# Patient Record
Sex: FEMALE | Race: WHITE | NOT HISPANIC OR LATINO | Employment: UNEMPLOYED | URBAN - METROPOLITAN AREA
[De-identification: names, ages, dates, MRNs, and addresses within clinical notes are randomized per-mention and may not be internally consistent; named-entity substitution may affect disease eponyms.]

---

## 2020-11-19 ENCOUNTER — OFFICE VISIT (OUTPATIENT)
Dept: FAMILY MEDICINE CLINIC | Facility: CLINIC | Age: 45
End: 2020-11-19
Payer: COMMERCIAL

## 2020-11-19 VITALS
HEART RATE: 82 BPM | BODY MASS INDEX: 22.91 KG/M2 | DIASTOLIC BLOOD PRESSURE: 64 MMHG | TEMPERATURE: 99.4 F | HEIGHT: 64 IN | WEIGHT: 134.2 LBS | SYSTOLIC BLOOD PRESSURE: 108 MMHG | RESPIRATION RATE: 16 BRPM

## 2020-11-19 DIAGNOSIS — F17.200 TOBACCO DEPENDENCE: ICD-10-CM

## 2020-11-19 DIAGNOSIS — C91.01 ALL (ACUTE LYMPHOID LEUKEMIA) IN REMISSION (HCC): ICD-10-CM

## 2020-11-19 DIAGNOSIS — E04.9 ENLARGED THYROID: ICD-10-CM

## 2020-11-19 DIAGNOSIS — G44.219 EPISODIC TENSION-TYPE HEADACHE, NOT INTRACTABLE: ICD-10-CM

## 2020-11-19 DIAGNOSIS — Z23 IMMUNIZATION DUE: ICD-10-CM

## 2020-11-19 DIAGNOSIS — Z12.31 BREAST CANCER SCREENING BY MAMMOGRAM: ICD-10-CM

## 2020-11-19 DIAGNOSIS — N32.81 OVERACTIVE BLADDER: ICD-10-CM

## 2020-11-19 DIAGNOSIS — Z00.00 ANNUAL PHYSICAL EXAM: Primary | ICD-10-CM

## 2020-11-19 PROCEDURE — 90686 IIV4 VACC NO PRSV 0.5 ML IM: CPT | Performed by: FAMILY MEDICINE

## 2020-11-19 PROCEDURE — 3008F BODY MASS INDEX DOCD: CPT | Performed by: FAMILY MEDICINE

## 2020-11-19 PROCEDURE — 90732 PPSV23 VACC 2 YRS+ SUBQ/IM: CPT | Performed by: FAMILY MEDICINE

## 2020-11-19 PROCEDURE — 90472 IMMUNIZATION ADMIN EACH ADD: CPT | Performed by: FAMILY MEDICINE

## 2020-11-19 PROCEDURE — 99386 PREV VISIT NEW AGE 40-64: CPT | Performed by: FAMILY MEDICINE

## 2020-11-19 PROCEDURE — 3725F SCREEN DEPRESSION PERFORMED: CPT | Performed by: FAMILY MEDICINE

## 2020-11-19 PROCEDURE — 90471 IMMUNIZATION ADMIN: CPT | Performed by: FAMILY MEDICINE

## 2020-11-19 RX ORDER — SOLIFENACIN SUCCINATE 10 MG/1
5 TABLET, FILM COATED ORAL DAILY
COMMUNITY
End: 2020-11-19 | Stop reason: ALTCHOICE

## 2020-11-19 RX ORDER — OXYBUTYNIN CHLORIDE 10 MG/1
10 TABLET, EXTENDED RELEASE ORAL DAILY
COMMUNITY
Start: 2020-10-16

## 2020-11-23 ENCOUNTER — TELEPHONE (OUTPATIENT)
Dept: FAMILY MEDICINE CLINIC | Facility: CLINIC | Age: 45
End: 2020-11-23

## 2020-11-24 ENCOUNTER — TELEMEDICINE (OUTPATIENT)
Dept: FAMILY MEDICINE CLINIC | Facility: CLINIC | Age: 45
End: 2020-11-24
Payer: COMMERCIAL

## 2020-11-24 DIAGNOSIS — L30.9 DERMATITIS: Primary | ICD-10-CM

## 2020-11-24 PROCEDURE — 99213 OFFICE O/P EST LOW 20 MIN: CPT | Performed by: FAMILY MEDICINE

## 2020-11-24 PROCEDURE — 4004F PT TOBACCO SCREEN RCVD TLK: CPT | Performed by: FAMILY MEDICINE

## 2020-11-24 RX ORDER — PREDNISONE 20 MG/1
20 TABLET ORAL DAILY
Qty: 5 TABLET | Refills: 0 | Status: SHIPPED | OUTPATIENT
Start: 2020-11-24 | End: 2020-11-29

## 2020-12-22 ENCOUNTER — OFFICE VISIT (OUTPATIENT)
Dept: FAMILY MEDICINE CLINIC | Facility: CLINIC | Age: 45
End: 2020-12-22
Payer: COMMERCIAL

## 2020-12-22 ENCOUNTER — LAB (OUTPATIENT)
Dept: LAB | Facility: CLINIC | Age: 45
End: 2020-12-22
Payer: COMMERCIAL

## 2020-12-22 VITALS
HEART RATE: 85 BPM | HEIGHT: 64 IN | WEIGHT: 130 LBS | TEMPERATURE: 97.3 F | OXYGEN SATURATION: 96 % | SYSTOLIC BLOOD PRESSURE: 100 MMHG | DIASTOLIC BLOOD PRESSURE: 70 MMHG | BODY MASS INDEX: 22.2 KG/M2

## 2020-12-22 DIAGNOSIS — Z00.00 ANNUAL PHYSICAL EXAM: ICD-10-CM

## 2020-12-22 DIAGNOSIS — G44.219 EPISODIC TENSION-TYPE HEADACHE, NOT INTRACTABLE: ICD-10-CM

## 2020-12-22 DIAGNOSIS — E04.9 ENLARGED THYROID: ICD-10-CM

## 2020-12-22 DIAGNOSIS — G44.219 EPISODIC TENSION-TYPE HEADACHE, NOT INTRACTABLE: Primary | ICD-10-CM

## 2020-12-22 DIAGNOSIS — C91.01 ALL (ACUTE LYMPHOID LEUKEMIA) IN REMISSION (HCC): ICD-10-CM

## 2020-12-22 LAB
ALBUMIN SERPL BCP-MCNC: 4.2 G/DL (ref 3.5–5)
ALP SERPL-CCNC: 58 U/L (ref 46–116)
ALT SERPL W P-5'-P-CCNC: 20 U/L (ref 12–78)
ANION GAP SERPL CALCULATED.3IONS-SCNC: 5 MMOL/L (ref 4–13)
AST SERPL W P-5'-P-CCNC: 15 U/L (ref 5–45)
BASOPHILS # BLD AUTO: 0.08 THOUSANDS/ΜL (ref 0–0.1)
BASOPHILS NFR BLD AUTO: 1 % (ref 0–1)
BILIRUB SERPL-MCNC: 0.46 MG/DL (ref 0.2–1)
BUN SERPL-MCNC: 11 MG/DL (ref 5–25)
CALCIUM SERPL-MCNC: 9.4 MG/DL (ref 8.3–10.1)
CHLORIDE SERPL-SCNC: 109 MMOL/L (ref 100–108)
CHOLEST SERPL-MCNC: 230 MG/DL (ref 50–200)
CO2 SERPL-SCNC: 26 MMOL/L (ref 21–32)
CREAT SERPL-MCNC: 0.81 MG/DL (ref 0.6–1.3)
EOSINOPHIL # BLD AUTO: 0.18 THOUSAND/ΜL (ref 0–0.61)
EOSINOPHIL NFR BLD AUTO: 3 % (ref 0–6)
ERYTHROCYTE [DISTWIDTH] IN BLOOD BY AUTOMATED COUNT: 12.9 % (ref 11.6–15.1)
GFR SERPL CREATININE-BSD FRML MDRD: 88 ML/MIN/1.73SQ M
GLUCOSE P FAST SERPL-MCNC: 83 MG/DL (ref 65–99)
HCT VFR BLD AUTO: 43.7 % (ref 34.8–46.1)
HDLC SERPL-MCNC: 68 MG/DL
HGB BLD-MCNC: 14.3 G/DL (ref 11.5–15.4)
IMM GRANULOCYTES # BLD AUTO: 0.03 THOUSAND/UL (ref 0–0.2)
IMM GRANULOCYTES NFR BLD AUTO: 0 % (ref 0–2)
LDLC SERPL CALC-MCNC: 148 MG/DL (ref 0–100)
LYMPHOCYTES # BLD AUTO: 1.5 THOUSANDS/ΜL (ref 0.6–4.47)
LYMPHOCYTES NFR BLD AUTO: 21 % (ref 14–44)
MAGNESIUM SERPL-MCNC: 2.7 MG/DL (ref 1.6–2.6)
MCH RBC QN AUTO: 30.9 PG (ref 26.8–34.3)
MCHC RBC AUTO-ENTMCNC: 32.7 G/DL (ref 31.4–37.4)
MCV RBC AUTO: 94 FL (ref 82–98)
MONOCYTES # BLD AUTO: 0.51 THOUSAND/ΜL (ref 0.17–1.22)
MONOCYTES NFR BLD AUTO: 7 % (ref 4–12)
NEUTROPHILS # BLD AUTO: 4.78 THOUSANDS/ΜL (ref 1.85–7.62)
NEUTS SEG NFR BLD AUTO: 68 % (ref 43–75)
NRBC BLD AUTO-RTO: 0 /100 WBCS
PLATELET # BLD AUTO: 317 THOUSANDS/UL (ref 149–390)
PMV BLD AUTO: 10.4 FL (ref 8.9–12.7)
POTASSIUM SERPL-SCNC: 4 MMOL/L (ref 3.5–5.3)
PROT SERPL-MCNC: 7.7 G/DL (ref 6.4–8.2)
RBC # BLD AUTO: 4.63 MILLION/UL (ref 3.81–5.12)
SODIUM SERPL-SCNC: 140 MMOL/L (ref 136–145)
TRIGL SERPL-MCNC: 68 MG/DL
TSH SERPL DL<=0.05 MIU/L-ACNC: 1.02 UIU/ML (ref 0.36–3.74)
VIT B12 SERPL-MCNC: 1354 PG/ML (ref 100–900)
WBC # BLD AUTO: 7.08 THOUSAND/UL (ref 4.31–10.16)

## 2020-12-22 PROCEDURE — 83735 ASSAY OF MAGNESIUM: CPT

## 2020-12-22 PROCEDURE — 82607 VITAMIN B-12: CPT

## 2020-12-22 PROCEDURE — 4004F PT TOBACCO SCREEN RCVD TLK: CPT | Performed by: FAMILY MEDICINE

## 2020-12-22 PROCEDURE — 86800 THYROGLOBULIN ANTIBODY: CPT

## 2020-12-22 PROCEDURE — 84443 ASSAY THYROID STIM HORMONE: CPT

## 2020-12-22 PROCEDURE — 80053 COMPREHEN METABOLIC PANEL: CPT

## 2020-12-22 PROCEDURE — 80061 LIPID PANEL: CPT

## 2020-12-22 PROCEDURE — 85025 COMPLETE CBC W/AUTO DIFF WBC: CPT

## 2020-12-22 PROCEDURE — 86376 MICROSOMAL ANTIBODY EACH: CPT

## 2020-12-22 PROCEDURE — 99213 OFFICE O/P EST LOW 20 MIN: CPT | Performed by: FAMILY MEDICINE

## 2020-12-22 PROCEDURE — 3008F BODY MASS INDEX DOCD: CPT | Performed by: FAMILY MEDICINE

## 2020-12-22 PROCEDURE — 36415 COLL VENOUS BLD VENIPUNCTURE: CPT

## 2020-12-22 RX ORDER — MAGNESIUM 30 MG
500 TABLET ORAL DAILY
COMMUNITY

## 2020-12-23 DIAGNOSIS — E01.0 THYROMEGALY: Primary | ICD-10-CM

## 2020-12-23 LAB
THYROGLOB AB SERPL-ACNC: 1.2 IU/ML (ref 0–0.9)
THYROPEROXIDASE AB SERPL-ACNC: 53 IU/ML (ref 0–34)

## 2021-01-14 ENCOUNTER — ANNUAL EXAM (OUTPATIENT)
Dept: FAMILY MEDICINE CLINIC | Facility: CLINIC | Age: 46
End: 2021-01-14
Payer: COMMERCIAL

## 2021-01-14 VITALS
OXYGEN SATURATION: 98 % | HEART RATE: 98 BPM | TEMPERATURE: 98.2 F | HEIGHT: 64 IN | SYSTOLIC BLOOD PRESSURE: 124 MMHG | WEIGHT: 134.6 LBS | BODY MASS INDEX: 22.98 KG/M2 | DIASTOLIC BLOOD PRESSURE: 84 MMHG

## 2021-01-14 DIAGNOSIS — Z12.4 CERVICAL CANCER SCREENING: ICD-10-CM

## 2021-01-14 DIAGNOSIS — Z01.419 VISIT FOR GYNECOLOGIC EXAMINATION: Primary | ICD-10-CM

## 2021-01-14 DIAGNOSIS — G43.009 MIGRAINE WITHOUT AURA AND WITHOUT STATUS MIGRAINOSUS, NOT INTRACTABLE: ICD-10-CM

## 2021-01-14 PROCEDURE — 99213 OFFICE O/P EST LOW 20 MIN: CPT | Performed by: FAMILY MEDICINE

## 2021-01-14 PROCEDURE — 3008F BODY MASS INDEX DOCD: CPT | Performed by: FAMILY MEDICINE

## 2021-01-14 PROCEDURE — G0124 SCREEN C/V THIN LAYER BY MD: HCPCS | Performed by: PATHOLOGY

## 2021-01-14 PROCEDURE — G0145 SCR C/V CYTO,THINLAYER,RESCR: HCPCS | Performed by: PATHOLOGY

## 2021-01-14 PROCEDURE — 87624 HPV HI-RISK TYP POOLED RSLT: CPT | Performed by: FAMILY MEDICINE

## 2021-01-14 PROCEDURE — 4004F PT TOBACCO SCREEN RCVD TLK: CPT | Performed by: FAMILY MEDICINE

## 2021-01-14 NOTE — PATIENT INSTRUCTIONS
Hypothyroidism   AMBULATORY CARE:   Hypothyroidism  is a condition that develops when the thyroid gland does not make enough thyroid hormone  Thyroid hormones help control body temperature, heart rate, growth, and weight  Common signs and symptoms include the following: The signs and symptoms may develop slowly, sometimes over several years  · Exhaustion, depression, or irritability    · Sensitivity to cold    · Muscle aches, headaches, weakness, or trouble concentrating    · Dry, flaky skin, brittle nails, or thinning hair    · Recent weight gain without overeating, or constipation    · Heavy or irregular monthly periods    · Puffiness around your eyes or swelling in your hands and feet    · Low heart rate, changes in your blood pressure    Call your local emergency number (911 in the [address] [facility]) or have someone call if:   · You have sudden chest pain or shortness of breath  · You have a seizure  · You feel like you are going to faint  Seek care immediately if:   · You have diarrhea, tremors, or trouble sleeping  · Your legs, ankles, or feet are swollen  Call your doctor or endocrinologist if:   · You have a fever  · You have chills, a cough, or feel weak and achy  · You have pain and swelling in your muscles and joints  · Your skin is itchy, swollen, or you have a rash  · Your signs and symptoms return or get worse, even after treatment  · You have questions or concerns about your condition or care  Treatment:  Thyroid hormone replacement medicine may bring your thyroid hormone level back to normal  You will need to take this medicine for the rest of your life to control hypothyroidism  It is important to take the medicine every day as directed  You will be given instructions for what to do if you miss a dose  Ask your healthcare provider for more information on other medicines you may need  Manage hypothyroidism:   · Get more iodine    The thyroid gland uses iodine to work correctly and to make thyroid hormones  Your healthcare provider may tell you to eat foods that are rich in iodine  He or she will tell you how much of these foods to eat  Milk and seafood are good sources of iodine  You may also need iodine supplements  · Keep track of your blood pressure and weight:      ? Check your blood pressure  and write it down as often as directed  It is important to measure your blood pressure on the same arm and in the same position each time  Keep track of your blood pressure readings, along with the date and time you took them  Take this record with you to your followup visits  ? Weigh yourself daily  before breakfast after you urinate  Weight gain may be a sign of extra fluid in your body  Keep track of your daily weights and take the record with you to your followup visits  Follow up with your doctor or endocrinologist as directed: You may need to return for more blood tests to check your thyroid hormone level  This will show if you are getting the right amount of thyroid medicine  Write down your questions so you remember to ask them during your visits  © Copyright [address] Information is for End User's use only and may not be sold, redistributed or otherwise used for commercial purposes  All illustrations and images included in CareNotes® are the copyrighted property of A D A M , Inc  or [facility] [employer]  The above information is an  only  It is not intended as medical advice for individual conditions or treatments  Talk to your doctor, nurse or pharmacist before following any medical regimen to see if it is safe and effective for you  Headache/Migraine Instructions from Dr Aníbal Mathur:    Headache management instructions  - When patient has a moderate to severe headache, they should seek rest, initiate relaxation and apply cold compresses to the head  - Maintain regular sleep schedule  Adults need at least 7-8 hours of uninterrupted a night     - Limit over the counter medications such as Tylenol, Ibuprofen, Aleve, Excedrin  (No more than 2- 3 times a week or max 10 a month)  - Maintain headache diary  Free NELIDA for a smart phone, which can be used is "Migraine silvina"  - Limit caffeine to 1-2 cups 8 to 16 oz a day or less  - Avoid dietary trigger  (aged cheese, peanuts, MSG, aspartame and nitrates)  - Patient is to have regular frequent meals to prevent headache onset  - Please drink at least 64 ounces of water a day to help remain hydrated  Preventive therapy for headaches:   -Over-the-counter supplements: to decrease intensity and frequency of migraines  - Magnesium Oxide 400mg a day  If any diarrhea or upset stomach, decrease dose  as tolerated  - Vitamin B2 200 mg twice a day  May cause the urine to turn yellow which is normal for B 2 to do and is not a sign that you are dehydrated  Neck pain:   - Neck pathology and poor posture, with straightening of the normal cervical lordosis, can cause headaches  Tightening of the neck muscles can irritate the nerves in the occipital region of the head and cause or worsen head pain  Thus neck strengthening and relaxation exercises, can help improve this particular pain  It is importance to have good posture for improving shoulder, neck, and head pain  - Here are some exercises which should take 5 minutes:     1  Standing, drop your head to one side while continuing to look ahead  Hold for 10 seconds then swap sides  Repeat twice more each side  To increase the stretch, drop the opposite shoulder  2  Standing again, lower your chin to your chest, hold for 10 and then look up to the ceiling and hold for 10  Repeat twice more  3  Next, standing straight again, look over your right shoulder and hold firm for 10 seconds, then over your left shoulder for 10  Repeat this 3 times  4  Finally, while sitting upright, bring your head forward and hold for 10, then all the way back and hold for 10  If this simple exercise does not help improve the posture, we will consider formal physical therapy in the future  Medication overuse headaches:   - Medication overuse headache [facility]) and analgesic overuse can negate the effectiveness of headache preventive measures  Avoid medications with narcotics, barbiturates, or caffeine in them as these can cause rebound headaches after very few doses and can interfere with other headache medicine efficacy  Taking  any acute/abortive over the counter medication or prescription drugs for more than 2-3 days a week can cause medication overuse headache  Cognitive behavioral therapy (CBT) is a common type of talk therapy (psychotherapy) were you work with a psychotherapist or therapist   CBT helps you become aware of inaccurate or negative thinking so you can view challenging situations more clearly and respond to them in a more effective way  CBT can be a very helpful tool ? either alone or in combination with other therapies ? in treating mental health disorders (depression, post-traumatic stress disorder (PTSD) or an eating disorder) and chronic pain  CBT can be an effective tool to help anyone learn how to better manage stressful life situations and pain  In some cases, CBT is most effective when it's combined with other treatments, such as antidepressants or other medications  You can start yourself by down loading the filipe: [employer]      Importance of Healthy Sleep:  Behavioral sleep changes can promote restful, regular sleep and reduce headache  Simple changes like establishing consistent sleep and wake-up times, as well as getting between 7 and 8 hours of sleep a day, can make a world of difference  Experts also recommend avoiding substances that impair sleep, like caffeine, nicotine and alcohol, and also suggest winding down before bed to prevent sleep problems  To read more go to https://americanmigrainefoundation  org/resource-library/sleep/    Exercising for migraineurs:  Regular exercise can reduce the frequency and intensity of headaches and migraines  When one exercises, the body releases endorphins, which are the bodys natural painkillers  Exercise reduces stress and helps individuals to sleep at night  Exercising at least 30 to 40 minutes 3 times a week is sufficient for most patients  When exercising, follow this plan to prevent headaches:  - First, stay hydrated before, during, and after exercise  - Second part of the exercise plan is to eat sufficient food about an hour and a half before you exercise  Exercise causes ones blood sugar level to decrease, and it is important to have a source of energy    - Final part of the exercise plan is to warm-up  Do not jump into sudden, vigorous exercise if that triggers a headache or migraine  To read more go to https://americanmigrainefoundation  org/resource-library/effects-of-exercise-on-headaches-and-migraines/

## 2021-01-14 NOTE — PROGRESS NOTES
ASSESSMENT & PLAN: Dixon Francisco is a 39 y o  oJse Lieberman presenting for a  gynecologic exam     1   Routine well woman exam done today  2  Pap and HPV:  The patient's Pap and cotesting was done today  Current ASCCP Guidelines reviewed  3   Mammogram ordered  4  The following were reviewed in today's visit: breast self exam, STD testing, menopause, osteoporosis, adequate intake of calcium and vitamin D, exercise and healthy diet  5  Headaches: referral to neurology due to patient concern and desire for further workup  6  Reviewed labs with patient - she had not read the [employer] message that was sent with results of possible new Hashimoto thyroiditis with normal thyroid function currently, and asymptomatic  Recommend q6 mo TSH, obtain thyroid US  CC:  Annual Gynecologic Examination    HPI: Dixon Francisco is a 39 y o  Jose Lieberman who presents for annual gynecologic examination  She has the following concerns:  Headaches are getting more frequent  Health Maintenance:    She does not perform regular monthly self breast exams  She feels safe at home  Patients does not follow a  diet  Her last pap: Not on file  Last mammogram: Not on file    Past Medical History:   Diagnosis Date    Acute lymphocytic leukemia ([facility] [address] )     Pilar cyst        Past Surgical History:   Procedure Laterality Date    CYST REMOVAL      WISDOM TOOTH EXTRACTION         Past OB/Gyn History:  OB History        1    Para   0    Term   0       0    AB   1    Living   0       SAB   0    TAB   1    Ectopic   0    Multiple   0    Live Births   0                  Patient's last menstrual period was 2020 (exact date)  History of sexually transmitted infection No  History of abnormal pap smears  No     Patient is currently sexually active  heterosexual Birth control: none      Family History   Problem Relation Age of Onset    Arthritis Mother     Hypertension Mother     Prostate cancer Father     No Known Problems Sister     No Known Problems Brother     Ovarian cancer Maternal Grandmother     Heart disease Maternal Grandfather     Hypertension Maternal Grandfather     Hyperlipidemia Maternal Grandfather     Melanoma Paternal Grandmother     Heart disease Paternal Grandfather     Hyperlipidemia Paternal Grandfather     Diabetes Neg Hx     Asthma Neg Hx     COPD Neg Hx     Colon cancer Neg Hx     Breast cancer Neg Hx     Depression Neg Hx     Mental illness Neg Hx        Social History:  Social History     Socioeconomic History    Marital status: /Civil Union     Spouse name: Michele Zamarripa Number of children: 0    Years of education: Not on file    Highest education level: Not on file   Occupational History    Not on file   Social Needs    Financial resource strain: Not hard at all   [place] insecurity     Worry: Never true     Inability: Never true   [employer] needs     Medical: No     Non-medical: No   Tobacco Use    Smoking status: Current Every Day Smoker     Packs/day: 0 25     Years: 28 00     Pack years: 7 00     Types: Cigarettes    Smokeless tobacco: Never Used   Substance and Sexual Activity    Alcohol use: Yes     Frequency: Monthly or less     Drinks per session: 1 or 2     Binge frequency: Never     Comment: social    Drug use: Never    Sexual activity: Yes     Partners: Male     Birth control/protection: None   Lifestyle    Physical activity     Days per week: 3 days     Minutes per session: 20 min    Stress: Not at all   Relationships    Social connections     Talks on phone: Not on file     Gets together: Not on file     Attends [employer] service: Not on file     Active member of club or organization: Not on file     Attends meetings of clubs or organizations: Not on file     Relationship status:     Intimate partner violence     Fear of current or ex partner: Not on file     Emotionally abused: Not on file     Physically abused: Not on file     Forced sexual activity: Not on file   Other Topics Concern    Not on file   Social History Narrative    Menses irregular  No menopausal symptoms    No pap, mammo      No Known Allergies    Current Outpatient Medications:     magnesium 30 MG tablet, Take 500 mg by mouth daily, Disp: , Rfl:     oxybutynin (DITROPAN-XL) 10 MG 24 hr tablet, Take 10 mg by mouth daily, Disp: , Rfl:     Review of Systems:  A complete review of systems was performed and was negative, except as listed  Review of Systems   Constitutional: Negative for chills, diaphoresis, fatigue and fever  HENT: Negative for sore throat  Respiratory: Negative for cough and shortness of breath  Gastrointestinal: Negative for abdominal pain, constipation, diarrhea, nausea and vomiting  Genitourinary: Negative for dysuria, flank pain, frequency, pelvic pain, urgency and vaginal bleeding  Musculoskeletal: Negative for myalgias  Skin: Negative for rash  Neurological: Positive for headaches  Negative for light-headedness  All other systems reviewed and are negative  Physical Exam:  /84   Pulse 98   Temp 98 2 °F (36 8 °C)   Ht 5' 4" (1 626 m)   Wt 61 1 kg (134 lb 9 6 oz)   LMP 12/29/2020 (Exact Date)   SpO2 98%   BMI 23 10 kg/m²    GEN: The patient was alert and oriented x3, pleasant well-appearing female in no acute distress  HEENT:  Unremarkable, no anterior or posterior lymphadenopathy, no thyromegaly  CV:  RRR, no murmurs  RESP:  Clear to auscultation bilaterally  BREAST:  Symmetric breasts with no palpable breast masses or obvious breast lesions  She has no retractions or nipple discharge  She has no axillary abnormalities or palpable masses  Self breast exam is taught  ABD:  Soft, nontender, nondistended, normoactive bowel sounds  EXT:  WWP, nontender, no edema  BACK:  No CVA tenderness, no tenderness to palpation along spine  PELVIC:  Normal appearing external female genitalia, normal vaginal epithelium, No discharge   Cervix present with extropion and irregular mucosa   Bimanual: absent CMT,  normal uterus, non-tender  No palpable adnexal masses

## 2021-01-19 DIAGNOSIS — Z01.419 VISIT FOR GYNECOLOGIC EXAMINATION: Primary | ICD-10-CM

## 2021-01-19 LAB
LAB AP GYN PRIMARY INTERPRETATION: NORMAL
Lab: NORMAL
PATH INTERP SPEC-IMP: NORMAL

## 2021-01-23 LAB
HPV HR 12 DNA CVX QL NAA+PROBE: NEGATIVE
HPV16 DNA CVX QL NAA+PROBE: NEGATIVE
HPV18 DNA CVX QL NAA+PROBE: NEGATIVE

## 2021-03-31 ENCOUNTER — TELEPHONE (OUTPATIENT)
Dept: NEUROLOGY | Facility: CLINIC | Age: 46
End: 2021-03-31

## 2021-04-12 NOTE — TELEPHONE ENCOUNTER
Called and spoke to patient  Patient confirmed upcoming apt with   Patient has no issues or concerns at this time

## 2021-04-13 ENCOUNTER — CONSULT (OUTPATIENT)
Dept: NEUROLOGY | Facility: CLINIC | Age: 46
End: 2021-04-13
Payer: COMMERCIAL

## 2021-04-13 VITALS
HEIGHT: 64 IN | WEIGHT: 127 LBS | BODY MASS INDEX: 21.68 KG/M2 | HEART RATE: 90 BPM | DIASTOLIC BLOOD PRESSURE: 61 MMHG | SYSTOLIC BLOOD PRESSURE: 115 MMHG

## 2021-04-13 DIAGNOSIS — G43.009 MIGRAINE WITHOUT AURA AND WITHOUT STATUS MIGRAINOSUS, NOT INTRACTABLE: Primary | ICD-10-CM

## 2021-04-13 DIAGNOSIS — R51.9 CHRONIC DAILY HEADACHE: ICD-10-CM

## 2021-04-13 DIAGNOSIS — H57.02 PUPIL ASYMMETRY: ICD-10-CM

## 2021-04-13 PROCEDURE — 99205 OFFICE O/P NEW HI 60 MIN: CPT | Performed by: PSYCHIATRY & NEUROLOGY

## 2021-04-13 PROCEDURE — 4004F PT TOBACCO SCREEN RCVD TLK: CPT | Performed by: PSYCHIATRY & NEUROLOGY

## 2021-04-13 PROCEDURE — 3008F BODY MASS INDEX DOCD: CPT | Performed by: PSYCHIATRY & NEUROLOGY

## 2021-04-13 RX ORDER — RIZATRIPTAN BENZOATE 10 MG/1
10 TABLET ORAL ONCE AS NEEDED
Qty: 9 TABLET | Refills: 6 | Status: SHIPPED | OUTPATIENT
Start: 2021-04-13 | End: 2021-11-01

## 2021-04-13 RX ORDER — DEXAMETHASONE 2 MG/1
2 TABLET ORAL
Qty: 5 TABLET | Refills: 0 | Status: SHIPPED | OUTPATIENT
Start: 2021-04-13 | End: 2021-04-18

## 2021-04-13 RX ORDER — AMITRIPTYLINE HYDROCHLORIDE 10 MG/1
TABLET, FILM COATED ORAL
Qty: 150 TABLET | Refills: 4 | Status: SHIPPED | OUTPATIENT
Start: 2021-04-13 | End: 2021-08-31

## 2021-04-13 NOTE — PATIENT INSTRUCTIONS
Follow up with PCP regarding thyroid, thyroid ultrasound      Additional Testing:   Neurodiagnostic workup: MRI brain ordered     Headache/migraine treatment:   Abortive medications (for immediate treatment of a headache): It is ok to take ibuprofen, acetaminophen or naproxen (Advil, Tylenol,  Aleve, Excedrin) if they help your headaches you should limit these to No more than 3 times a week to avoid medication overuse/rebound headaches  Trial of decadron 2 mg daily for 5 days - ideally take in the morning with food     For your more moderate to severe migraines take this medication early   Maxalt (rizatriptan) 10mg tabs - take one at the onset of headache  May repeat one time after 1-2 hours if pain has not resolved  (Max 2 a day and 9 a month)     - some people may have some side effects from this medication, the other half typically do not  Common side effects include making you feel tired, palpitations, tingling or tightness of the face or chest   Most people report the side effects are nothing compared to their migraines and do not mind these  If you have intolerable side effects we will stop  Over the counter preventive supplements for headaches/migraines (if you try, try for 3 months straight)  (to take every day to help prevent headaches - not to take at the time of headache):  [x] Magnesium 400mg daily (If any diarrhea or upset stomach, decrease dose  as tolerated)  [x] Riboflavin (Vitamin B2) 200 mg (kids) to 400mg daily (adults) - try online   (FYI B2 may make your urine bright/neon yellow)    Prescription preventive medications for headaches/migraines   (to take every day to help prevent headaches - not to take at the time of headache):  [x] Amytriptyline start 10mg at bedtime   Increase by 10mg each week until good effect on headaches/pain or reach 50mg nightly       *Typically these types of medications take time untill you see the benefit, although some may see improvement in days, often it may take weeks, especially if the medication is being titrated up to a beneficial level  Please contact us if there are any concerns or questions regarding the medication  Lifestyle Recommendations:  [x] SLEEP - Maintain a regular sleep schedule: Adults need at least 7-8 hours of uninterrupted a night  Maintain good sleep hygiene:  Going to bed and waking up at consistent times, avoiding excessive daytime naps, avoiding caffeinated beverages in the evening, avoid excessive stimulation in the evening and generally using bed primarily for sleeping  One hour before bedtime would recommend turning lights down lower, decreasing your activity (may read quietly, listen to music at a low volume)  When you get into bed, should eliminate all technology (no texting, emailing, playing with your phone, iPad or tablet in bed)  [x] HYDRATION - Maintain good hydration  Drink  2L of fluid a day (4 typical small water bottles)  [x] DIET - Maintain good nutrition  In particular don't skip meals and try and eat healthy balanced meals regularly  [x] TRIGGERS - Look for other triggers and avoid them: Limit caffeine to 1-2 cups a day or less  Avoid dietary triggers that you have noticed bring on your headaches (this could include aged cheese, peanuts, MSG, aspartame and nitrates)  [x] EXERCISE - physical exercise as we all know is good for you in many ways, and not only is good for your heart, but also is beneficial for your mental health, cognitive health and  chronic pain/headaches  I would encourage at the least 5 days of physical exercise weekly for at least 30 minutes  Education and Follow-up  [x] Please call with any questions or concerns  Of course if any new concerning symptoms go to the emergency department    [x] Follow up 3 months     - As we discussed if there are no abnormalities on the brain MRI that need urgent intervention (very often there are incidental findings that may not mean anything significant and are better discussed in person), you will not necessarily receive a call from us, but feel free to call in to check on the status of the report (since not knowing can be anxiety provoking) and the nurses will let you know the result or make sure I have nothing to pass on regarding the results first   Ideally, all of this will be discussed in detail in the follow-up visit

## 2021-04-13 NOTE — PROGRESS NOTES
Review of Systems    {LimROS-complete:50144}      Review of Systems   Constitutional: Negative  Negative for appetite change and fever  HENT: Negative  Negative for hearing loss, tinnitus, trouble swallowing and voice change  Eyes: Negative  Negative for photophobia and pain  Respiratory: Negative  Negative for shortness of breath  Cardiovascular: Negative  Negative for palpitations  Gastrointestinal: Negative  Negative for nausea and vomiting  Endocrine: Negative  Negative for cold intolerance  Genitourinary: Negative  Negative for dysuria, frequency and urgency  Musculoskeletal: Negative  Negative for myalgias and neck pain  Skin: Negative  Negative for rash  Neurological: Positive for headaches (daily)  Negative for dizziness, tremors, seizures, syncope, facial asymmetry, speech difficulty, weakness, light-headedness and numbness  Hematological: Negative  Does not bruise/bleed easily  Psychiatric/Behavioral: Negative  Negative for confusion, hallucinations and sleep disturbance
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
## 2021-05-03 ENCOUNTER — TELEPHONE (OUTPATIENT)
Dept: NEUROLOGY | Facility: CLINIC | Age: 46
End: 2021-05-03

## 2021-05-03 NOTE — TELEPHONE ENCOUNTER
received request from [facility] requesting 90 day supply of rizatriptan  You sent a script to local pharm on 4/13 for qty of 9 with 6 refills    If agreeable, please send 90 day supply to [facility]

## 2021-05-04 NOTE — TELEPHONE ENCOUNTER
Patient requested a refill be sent for rizatriptan since she only has 2 pills left  Explained to her that 9 tabs is for 30 days and she should limit her use to no more than 3 tabs per week  She's aware there are refills on rx as well  Patient verbalized understanding

## 2021-05-06 ENCOUNTER — HOSPITAL ENCOUNTER (OUTPATIENT)
Dept: MRI IMAGING | Facility: HOSPITAL | Age: 46
Discharge: HOME | End: 2021-05-06
Attending: PSYCHIATRY & NEUROLOGY
Payer: COMMERCIAL

## 2021-05-06 DIAGNOSIS — R51.9 CHRONIC DAILY HEADACHE: ICD-10-CM

## 2021-05-06 DIAGNOSIS — H57.02 PUPIL ASYMMETRY: ICD-10-CM

## 2021-05-06 DIAGNOSIS — G43.009 MIGRAINE WITHOUT AURA AND WITHOUT STATUS MIGRAINOSUS, NOT INTRACTABLE: ICD-10-CM

## 2021-05-06 PROCEDURE — 70553 MRI BRAIN STEM W/O & W/DYE: CPT

## 2021-05-06 PROCEDURE — G1004 CDSM NDSC: HCPCS

## 2021-05-06 PROCEDURE — A9585 GADOBUTROL INJECTION: HCPCS | Performed by: PSYCHIATRY & NEUROLOGY

## 2021-05-06 RX ADMIN — GADOBUTROL 5 ML: 604.72 INJECTION INTRAVENOUS at 14:23

## 2021-05-07 NOTE — TELEPHONE ENCOUNTER
Received another fax form [facility] requesting 90 day supply  Faxed back to [facility] stating that provider does not prescribe 90 day supply of this medication

## 2021-05-24 ENCOUNTER — HOSPITAL ENCOUNTER (OUTPATIENT)
Dept: MAMMOGRAPHY | Facility: HOSPITAL | Age: 46
Discharge: HOME | End: 2021-05-24
Payer: COMMERCIAL

## 2021-05-24 VITALS — WEIGHT: 127 LBS | BODY MASS INDEX: 21.68 KG/M2 | HEIGHT: 64 IN

## 2021-05-24 DIAGNOSIS — Z12.31 BREAST CANCER SCREENING BY MAMMOGRAM: ICD-10-CM

## 2021-05-24 PROCEDURE — 77067 SCR MAMMO BI INCL CAD: CPT

## 2021-05-24 PROCEDURE — 77063 BREAST TOMOSYNTHESIS BI: CPT

## 2021-06-01 ENCOUNTER — HOSPITAL ENCOUNTER (OUTPATIENT)
Dept: ULTRASOUND IMAGING | Facility: CLINIC | Age: 46
Discharge: HOME | End: 2021-06-01
Payer: COMMERCIAL

## 2021-06-01 DIAGNOSIS — R92.8 ABNORMAL MAMMOGRAM: ICD-10-CM

## 2021-06-01 PROCEDURE — 76642 ULTRASOUND BREAST LIMITED: CPT

## 2021-07-13 ENCOUNTER — TELEPHONE (OUTPATIENT)
Dept: DERMATOLOGY | Facility: CLINIC | Age: 46
End: 2021-07-13

## 2021-07-14 ENCOUNTER — TELEPHONE (OUTPATIENT)
Dept: NEUROLOGY | Facility: CLINIC | Age: 46
End: 2021-07-14

## 2021-07-14 NOTE — TELEPHONE ENCOUNTER
Called and spoke to patient - confirmed upcoming appointment with Dr Cam Bosworth  Provided patient with apt date, time and location  Informed patient that check in is at least 15 minutes prior to apt time

## 2021-07-28 ENCOUNTER — OFFICE VISIT (OUTPATIENT)
Dept: NEUROLOGY | Facility: CLINIC | Age: 46
End: 2021-07-28
Payer: COMMERCIAL

## 2021-07-28 VITALS
DIASTOLIC BLOOD PRESSURE: 58 MMHG | BODY MASS INDEX: 21.34 KG/M2 | SYSTOLIC BLOOD PRESSURE: 123 MMHG | HEART RATE: 86 BPM | WEIGHT: 125 LBS | HEIGHT: 64 IN

## 2021-07-28 DIAGNOSIS — R90.82 WHITE MATTER ABNORMALITY ON MRI OF BRAIN: ICD-10-CM

## 2021-07-28 DIAGNOSIS — R51.9 CHRONIC DAILY HEADACHE: ICD-10-CM

## 2021-07-28 DIAGNOSIS — T66.XXXS RADIATION INJURY OF BRAIN, SEQUELA: ICD-10-CM

## 2021-07-28 DIAGNOSIS — G37.9 CNS DEMYELINATION (HCC): ICD-10-CM

## 2021-07-28 DIAGNOSIS — G43.009 MIGRAINE WITHOUT AURA AND WITHOUT STATUS MIGRAINOSUS, NOT INTRACTABLE: Primary | ICD-10-CM

## 2021-07-28 PROCEDURE — 3008F BODY MASS INDEX DOCD: CPT | Performed by: PSYCHIATRY & NEUROLOGY

## 2021-07-28 PROCEDURE — 4004F PT TOBACCO SCREEN RCVD TLK: CPT | Performed by: PSYCHIATRY & NEUROLOGY

## 2021-07-28 PROCEDURE — 99215 OFFICE O/P EST HI 40 MIN: CPT | Performed by: PSYCHIATRY & NEUROLOGY

## 2021-07-28 NOTE — PROGRESS NOTES
Review of Systems   Constitutional: Negative  Negative for appetite change and fever  HENT: Positive for tinnitus (constant - both ears)  Negative for hearing loss, trouble swallowing and voice change  Eyes: Negative  Negative for photophobia and pain  Respiratory: Negative  Negative for shortness of breath  Cardiovascular: Negative  Negative for palpitations  Gastrointestinal: Negative  Negative for nausea and vomiting  Endocrine: Negative  Negative for cold intolerance  Genitourinary: Negative  Negative for dysuria, frequency and urgency  Musculoskeletal: Negative  Negative for myalgias and neck pain  Skin: Positive for rash (right palm)  Neurological: Positive for headaches (3 per month)  Negative for dizziness, tremors, seizures, syncope, facial asymmetry, speech difficulty, weakness, light-headedness and numbness  Hematological: Negative  Does not bruise/bleed easily  Psychiatric/Behavioral: Negative  Negative for confusion, hallucinations and sleep disturbance
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
## 2021-07-28 NOTE — PATIENT INSTRUCTIONS
Follow up with PCP regarding thyroid, thyroid ultrasound     Comparison to old MRI brain from 2012      Additional Testing:   Neurodiagnostic workup:  CTA head ordered     Headache/migraine treatment:   Abortive medications (for immediate treatment of a headache): It is ok to take ibuprofen, acetaminophen or naproxen (Advil, Tylenol,  Aleve, Excedrin) if they help your headaches you should limit these to No more than 3 times a week to avoid medication overuse/rebound headaches  For your more moderate to severe migraines take this medication early   Maxalt (rizatriptan) 10mg tabs - take one at the onset of headache  May repeat one time after 1-2 hours if pain has not resolved  (Max 2 a day and 9 a month)       Over the counter preventive supplements for headaches/migraines (if you try, try for 3 months straight)  (to take every day to help prevent headaches - not to take at the time of headache):  [x] Magnesium 400mg daily (If any diarrhea or upset stomach, decrease dose  as tolerated)    Prescription preventive medications for headaches/migraines   (to take every day to help prevent headaches - not to take at the time of headache):  [x] Amytriptyline decrease to 30 mg nightly, dont skip nights  If makes you too tired the next day, decrease to 20 mg        *Typically these types of medications take time untill you see the benefit, although some may see improvement in days, often it may take weeks, especially if the medication is being titrated up to a beneficial level  Please contact us if there are any concerns or questions regarding the medication  Lifestyle Recommendations:  [x] SLEEP - Maintain a regular sleep schedule: Adults need at least 7-8 hours of uninterrupted a night   Maintain good sleep hygiene:  Going to bed and waking up at consistent times, avoiding excessive daytime naps, avoiding caffeinated beverages in the evening, avoid excessive stimulation in the evening and generally using bed primarily for sleeping  One hour before bedtime would recommend turning lights down lower, decreasing your activity (may read quietly, listen to music at a low volume)  When you get into bed, should eliminate all technology (no texting, emailing, playing with your phone, iPad or tablet in bed)  [x] HYDRATION - Maintain good hydration  Drink  2L of fluid a day (4 typical small water bottles)  [x] DIET - Maintain good nutrition  In particular don't skip meals and try and eat healthy balanced meals regularly  [x] TRIGGERS - Look for other triggers and avoid them: Limit caffeine to 1-2 cups a day or less  Avoid dietary triggers that you have noticed bring on your headaches (this could include aged cheese, peanuts, MSG, aspartame and nitrates)  [x] EXERCISE - physical exercise as we all know is good for you in many ways, and not only is good for your heart, but also is beneficial for your mental health, cognitive health and  chronic pain/headaches  I would encourage at the least 5 days of physical exercise weekly for at least 30 minutes  Education and Follow-up  [x] Please call with any questions or concerns  Of course if any new concerning symptoms go to the emergency department    [x] Follow up 3 months, sooner if needed

## 2021-08-12 ENCOUNTER — OFFICE VISIT (OUTPATIENT)
Dept: DERMATOLOGY | Facility: CLINIC | Age: 46
End: 2021-08-12
Payer: COMMERCIAL

## 2021-08-12 VITALS — TEMPERATURE: 97.7 F | BODY MASS INDEX: 21.75 KG/M2 | WEIGHT: 127.4 LBS | HEIGHT: 64 IN

## 2021-08-12 DIAGNOSIS — L25.9 CONTACT DERMATITIS, UNSPECIFIED CONTACT DERMATITIS TYPE, UNSPECIFIED TRIGGER: ICD-10-CM

## 2021-08-12 DIAGNOSIS — B07.9 VERRUCA VULGARIS: Primary | ICD-10-CM

## 2021-08-12 PROCEDURE — 99204 OFFICE O/P NEW MOD 45 MIN: CPT | Performed by: DERMATOLOGY

## 2021-08-12 PROCEDURE — 3008F BODY MASS INDEX DOCD: CPT | Performed by: PSYCHIATRY & NEUROLOGY

## 2021-08-12 PROCEDURE — 17110 DESTRUCTION B9 LES UP TO 14: CPT | Performed by: DERMATOLOGY

## 2021-08-12 RX ORDER — CLOBETASOL PROPIONATE 0.5 MG/G
CREAM TOPICAL
Qty: 60 G | Refills: 0 | Status: SHIPPED | OUTPATIENT
Start: 2021-08-12

## 2021-08-12 RX ORDER — CANDIDA ALBICANS 1000 [PNU]/ML
0.1 INJECTION, SOLUTION INTRADERMAL ONCE
Status: COMPLETED | OUTPATIENT
Start: 2021-08-12 | End: 2021-08-12

## 2021-08-12 RX ADMIN — CANDIDA ALBICANS 0.1 ML: 1000 INJECTION, SOLUTION INTRADERMAL at 10:44

## 2021-08-12 NOTE — LETTER
August 12, 2021     Patient: Leeann Townsend   YOB: 1975   Date of Visit: 8/12/2021       To Whom it May Concern:    Leeann Townsend is under my professional care  She was seen in my office on 8/12/2021  She may return to work on 8/12/2021  If you have any questions or concerns, please don't hesitate to call           Sincerely,          Sherry Ordoñez MD        CC: No Recipients

## 2021-08-12 NOTE — PROGRESS NOTES
[facility] Note     Patient Name: Kiera Lundberg  Encounter Date: 8/12/2021     Have you been cared for by a [facility] Dermatologist in the last 3 years and, if so, which one? No    · Have you traveled outside of the [address] [place] [address] in the past 3 months or outside of the [facility] area in the last 2 weeks? No     May we call your Preferred Phone number to discuss your specific medical information? Yes     May we leave a detailed message that includes your specific medical information? Yes      Today's Chief Concerns:   Concern #1:  Spot of concern    Past Medical History:  Have you personally ever had or currently have any of the following? · Skin cancer (such as Melanoma, Basal Cell Carcinoma, Squamous Cell Carcinoma? (If Yes, please provide more detail)- No  · Eczema: No  · Psoriasis: No  · HIV/AIDS: No  · Hepatitis B or C: No  · Tuberculosis: No  · Systemic Immunosuppression such as Diabetes, Biologic or Immunotherapy, Chemotherapy, Organ Transplantation, Bone Marrow Transplantation (If YES, please provide more detail): YES, chemotherapy age 9  · Radiation Treatment (If YES, please provide more detail): YES, radiation age 9  · Any other major medical conditions/concerns? (If Yes, which types)- No    Social History:     What is/was your primary occupation? Part time: [employer]     What are your hobbies/past-times? Gardening     Family History:  Have any of your "first degree relatives" (parent, brother, sister, or child) had any of the following       · Skin cancer such as Melanoma or Merkel Cell Carcinoma or Pancreatic Cancer? No  · Eczema, Asthma, Hay Fever or Seasonal Allergies: YES, parents, siblings: seasonal allergies  · Psoriasis or Psoriatic Arthritis: No  · Do any other medical conditions seem to run in your family? If Yes, what condition and which relatives?   No    Current Medications:       Current Outpatient Medications:     amitriptyline (ELAVIL) 10 mg tablet, start 10mg at bedtime  Increase by 10mg each week until good effect on headaches/pain or reach 50mg daily (Patient taking differently: Take 50 mg by mouth every other day start 10mg at bedtime  Increase by 10mg each week until good effect on headaches/pain or reach 50mg daily), Disp: 150 tablet, Rfl: 4    magnesium 30 MG tablet, Take 500 mg by mouth daily, Disp: , Rfl:     oxybutynin (DITROPAN-XL) 10 MG 24 hr tablet, Take 10 mg by mouth daily, Disp: , Rfl:     rizatriptan (MAXALT) 10 MG tablet, Take 1 tablet (10 mg total) by mouth once as needed for migraine May repeat in 2 hours if needed  Max 2/24 hours, 9/month , Disp: 9 tablet, Rfl: 6      Review of Systems:  Have you recently had or currently have any of the following? If YES, what are you doing for the problem? · Fever, chills or unintended weight loss: No  · Sudden loss or change in your vision: No  · Nausea, vomiting or blood in your stool: No  · Painful or swollen joints: No  · Wheezing or cough: YES, smoker cough  · Changing mole or non-healing wound: No  · Nosebleeds: No  · Excessive sweating: No  · Easy or prolonged bleeding? No  · Over the last 2 weeks, how often have you been bothered by the following problems? · Taking little interest or pleasure in doing things: 1 - Not at All  · Feeling down, depressed, or hopeless: 1 - Not at All  · Rapid heartbeat with epinephrine:  No    · FEMALES ONLY:    · Are you pregnant or planning to become pregnant? No  · Are you currently or planning to be nursing or breast feeding? No    · Any known allergies? · No Known Allergies      Physical Exam:     Was a chaperone (Derm Clinical Assistant) present throughout the entire Physical Exam? Yes     Did the Dermatology Team specifically  the patient on the importance of a Full Skin Exam to be sure that nothing is missed clinically?  Yes}  o Did the patient ultimately request or accept a Full Skin Exam?  NO  o Did the patient specifically refuse to have the areas "under-the-bra" examined by the Dermatologist? Gianluca serna Did the patient specifically refuse to have the areas "under-the-underwear" examined by the Dermatologist? YES    CONSTITUTIONAL:   Vitals:    08/12/21 0923   Temp: 97 7 °F (36 5 °C)   TempSrc: Tympanic   Weight: 57 8 kg (127 lb 6 4 oz)   Height: 5' 4" (1 626 m)       PSYCH: Normal mood and affect  EYES: Normal conjunctiva  ENT: Normal lips and oral mucosa  CARDIOVASCULAR: No edema  RESPIRATORY: Normal respirations  HEME/LYMPH/IMMUNO:  No regional lymphadenopathy except as noted below in "ASSESSMENT AND PLAN BY DIAGNOSIS"    SKIN:  FULL ORGAN SYSTEM EXAM  Right Hand/Fingers Normal except as noted below in Assessment   Left Hand/Fingers Normal except as noted below in Assessment   Right Foot, Toes Normal except as noted below in Assessment   Left Foot, Toes Normal except as noted below in Assessment        Assessment and Plan by Diagnosis:    History of Present Condition:     Duration:  How long has this been an issue for you?    o  1-2 years   Location Affected:  Where on the body is this affecting you?    o  right hand   Quality:  Is there any bleeding, pain, itch, burning/irritation, or redness associated with the skin lesion? o  itchy, cracks, blisters   Severity:  Describe any bleeding, pain, itch, burning/irritation, or redness on a scale of 1 to 10 (with 10 being the worst)  o  10   Timing:  Does this condition seem to be there pretty constantly or do you notice it more at specific times throughout the day? o  constant   Context:  Have you ever noticed that this condition seems to be associated with specific activities you do?    o  denies   Modifying Factors:    o Anything that seems to make the condition worse?    -  denies  o What have you tried to do to make the condition better?     -  Aveeno sensitive skin lotion, Eucerin common skin cream    Associated Signs and Symptoms:  Does this skin lesion seem to be associated with any of the following:  o  SL AMB DERM SIGNS AND SYMPTOMS: Redness, Itching and Scratching and Pus or Discharge     VERRUCA VULGARIS ("Common Warts")    Physical Exam:   Anatomic Location Affected:  Bilateral hands   Morphological Description:  Verrucous papules   Pertinent Positives:   Pertinent Negatives: Additional History of Present Condition:  History of warts in the past  Currently just on fingers  No home treatments at this time  Assessment and Plan:  Based on a thorough discussion of this condition and the management approach to it (including a comprehensive discussion of the known risks, side effects and potential benefits of treatment), the patient (family) agrees to implement the following specific plan:  Mario today with shereen  Discussed that it can take multiple treatments to get rid of the warts  Froze with liquid nitrogen today  Candida injection done today  Follow up in 4 weeks for repeat treatment of warts    PROCEDURE:  DESTRUCTION OF BENIGN LESIONS  After a thorough discussion of treatment options and risk/benefits/alternatives (including but not limited to local pain, scarring, dyspigmentation, blistering, and possible superinfection), verbal and written consent were obtained and the aforementioned lesions were treated on with cryotherapy using liquid nitrogen x 1 cycle for 5-10 seconds   TOTAL NUMBER of 3 lesions were treated today on the ANATOMIC LOCATION: right 1st digit x 2, left 3rd digit x 1  The patient tolerated the procedure well, and after-care instructions were provided  PROCEDURE:  INTRALESIONAL SHERRY ANTIGEN    Purpose: Candida antigen is used "off label" as an immunotherapy agent in the treatment of warts  This is widely used technique endorsed by many pediatric dermatologists because of its utility in treating multiple lesions with minimal pain and discomfort and resulting sequelae to the treated areas  Indications:  It is used "off label" for the treatment of warts  Potential Side Effects: The patient signifies understanding that Candida antigen injection can potentially cause early and/or delayed adverse effects such as:    Pain    Local immune response    Bleeding    Skin discoloration   Swelling    Flu-like illness with increased lymphnodes   Serious allergic reaction (anaphylaxis)    PROCEDURE NOTE:  After verbal and written consent were obtained, the to-be-treated area was wiped and cleaned with rubbing alcohol 70%  Then, a total of 0 1 mL of refrigerated Candida antigen (Lot# ; Expiration 08/06/2022, NDC#: 88893-560-60) was injected intralesionally into a total of 1 lesion/s on the following anatomic areas:  Left 3rd finger using a 1-mL tuberculin syringe and a 30-gauge needle  There was less than 1 mL of blood loss and little to no discomfort  The area was bandaged with a Band-aid  The patient tolerated the procedure well and remained in the office for observation  With no signs of an adverse reaction, the patient was eventually discharged from clinic  CONTACT DERMATITIS    Physical Exam:   Anatomic Location Affected:  Right palm of hand   Morphological Description:  Well-demarcated thin, erythematous plaque with superficial desquamation   Pertinent Positives:   Pertinent Negatives: Additional History of Present Condition:  Patient reports she has the rash on/off for 1-2 years  It gets better and then flares again  She has tried no medicated treatments, only moisturizers  No involvement of other hand or feet  She states she recently started work at a [employer] but she has had the rash for the past year at least  She did not work for about 2 years before starting this job  At home, she does gardening and crafts (scrapbooking)      Assessment and Plan:  Diagnosis:  Contact dermatitis  Based on a thorough discussion of this condition and the management approach to it (including a comprehensive discussion of the known risks, side effects and potential benefits of treatment), the patient (family) agrees to implement the following specific plan:  · Apply clobetasol cream topically twice daily to hand 2-4 weeks  · Discussed that this is likely from exposure to something in the environment that she is allergic to (including materials for gardening and crafting)  · Try wearing cotton gloves first when wearing rubber gloves in case the allergy is to a material in the rubber gloves    · Recommend patch testing in the future if the rash does not resolve or if it recurs  · Follow up in 2-3 months      Scribe Attestation    I,:  Oliverio Bhat am acting as a scribe while in the presence of the attending physician :       I,:  Roman Wahl MD personally performed the services described in this documentation    as scribed in my presence :

## 2021-08-12 NOTE — PATIENT INSTRUCTIONS
CONTACT DERMATITIS  Assessment and Plan:  Diagnosis:  Contact dermatitis  Based on a thorough discussion of this condition and the management approach to it (including a comprehensive discussion of the known risks, side effects and potential benefits of treatment), the patient (family) agrees to implement the following specific plan:   * Apply clobetasol cream topically twice daily to hand 2-4 weeks   * Try wearing cotton gloves when wearing rubber gloves    * Recommend patch testing in the future    What is contact dermatitis? Contact dermatitis is a type of eczema that results from something coming in contact with the skin  There are 2 types:  irritant and allergic  The majority of cases are from irritation  Usually that is from contact with strong soaps, repeated exposure to water, contact with cleaning agents or food, or friction  The minority are an actual allergy  In these cases something is coming in contact with the skin and causing an allergic reaction, similar to what happens with poison ivy  This usually occurs unexpectedly after using something for many years  Even very tiny amounts of the substance on the skin can cause a reaction  Some common causes are fragrances, preservatives and metals  Sometimes this can occur when an allergic substance is eaten, as well, but most often it is from direct contact with the skin  To determine the cause of allergy a patch test is often done  Some general rules to follow for both types of contact dermatitis are:   Wear gloves when using strong cleansers or before prolonged contact with water (like washing dishes)   Use gentle cleansers and avoid strong soaps   Apply moisturizer to entire body after bathing    Avoid products with fragrance    Most often contact dermatitis is treated with topical medicines like topical steroids, eucrisa, pimecrolimus or tacrolimus     Some times oral steroids, ie prednisone, methylprednisone and prednisolone, are needed    In chronic cases, treatment options include:  light therapy, methotrexate, cyclosporin  VERRUCA VULGARIS ("Common Warts")    Assessment and Plan:  Based on a thorough discussion of this condition and the management approach to it (including a comprehensive discussion of the known risks, side effects and potential benefits of treatment), the patient (family) agrees to implement the following specific plan:   Cryotherapy used today in office   Candida injection administered    Verruca Vulgaris  A verruca is a common growth of the skin caused by infection by human papilloma virus (HPV)  There are many strains of the virus that cause different types of warts on the body  The virus infects the most superficial layers of the skin, causing increased production of skin cells and thickening  Warts can be spread through direct contact with infected skin and may spread to other parts of the body if scratched or picked  A verruca is more commonly called a "wart " Warts are particularly common in school-aged children but can arise at any age  Patients who have a history of eczema are especially prone due to impaired skin barrier  Those taking immunosuppressive drugs or with HIV infections may experience prolonged symptoms despite treatment  Warts generally have a rough surface with a tiny black dot sometimes observed in the middle of each scaly spot  They can range in size from a small bump to large scaly growths  Common warts are often found on the backs of fingers or toes, around the nails, and on the knees  Plantar warts can grow inwardly on the soles of the feet causing pain  There are many possible ways to treat warts and sometimes several different treatments are needed to get the warts to go away completely  There is no single perfect treatment for warts, and successful treatment can take many months  In-office treatments usually require multiple visits, and include:  1) Cryotherapy   a cold spray with liquid nitrogen will destroy the infected cells but may lead to discomfort and blistering  It may also leave a permanent white asad or scar  2) Electrosurgery (curettage and cautery) can be used for large resistant warts which involves shaving the growth down and burning the base  It is performed under local anesthesia and may leave a permanent scar    3) Candida (yeast) antigen injections  These are extracts of the common yeast (Candida) that cannot cause an infection  The medication is injected into/under the wart  It is thought to stimulate the immune system to recognize the wart virus and attack it  Multiple injections are often needed about one month apart  There are also several at-home wart treatments:    1) Soak the warts in warm water for 5 minutes every night followed by gentle filing with a nail file or pumice stone  2) Topical salicylic acid or similar compounds work by removing the dead surface skin cells  a  Apply the medicine directly to the wart, wait for it to dry completely, then cover with duct tape overnight   b  Repeat until the wart is gone, which can take 2-4 months  c  Do not use on the face or groin area   d  If the wart paint makes the skin sore, stop treatment until the discomfort has settled, then recommence as above   e  Take care to keep the chemical off normal skin  3) Podophyllin is a cytotoxic agent used in some products and must not be used in pregnancy or women considering pregnancy  4) Some prescription medications include   a  Topical retinoids (adapalene, tretinoin, tazarotene), 5-fluorouracil (Efudex) or imiquimod (Aldara) creams are sometimes used to treat flat warts or warts on the face and other sensitive anatomical areas  They are usually applied directly to the warts once a day for 2-4 months and can be irritating  These treatments should only be used as directed by your health care provider    b  Systemic treatment with oral cimetidine (Tagamet) may help boost the immune system against the wart virus in patients, some of the time  Initiation of cimetidine therapy should ONLY be done under the supervision of your health care provider, who can discuss possible side effects and drug-to-drug interactions of this specific treatment

## 2021-08-13 ENCOUNTER — HOSPITAL ENCOUNTER (OUTPATIENT)
Dept: CT IMAGING | Facility: HOSPITAL | Age: 46
Discharge: HOME | End: 2021-08-13
Attending: PSYCHIATRY & NEUROLOGY
Payer: COMMERCIAL

## 2021-08-13 DIAGNOSIS — R51.9 CHRONIC DAILY HEADACHE: ICD-10-CM

## 2021-08-13 DIAGNOSIS — G43.009 MIGRAINE WITHOUT AURA AND WITHOUT STATUS MIGRAINOSUS, NOT INTRACTABLE: ICD-10-CM

## 2021-08-13 DIAGNOSIS — G37.9 CNS DEMYELINATION (HCC): ICD-10-CM

## 2021-08-13 DIAGNOSIS — R90.82 WHITE MATTER ABNORMALITY ON MRI OF BRAIN: ICD-10-CM

## 2021-08-13 PROCEDURE — G1004 CDSM NDSC: HCPCS

## 2021-08-13 PROCEDURE — 70496 CT ANGIOGRAPHY HEAD: CPT

## 2021-08-13 RX ADMIN — IOHEXOL 85 ML: 350 INJECTION, SOLUTION INTRAVENOUS at 21:45

## 2021-08-31 DIAGNOSIS — R51.9 CHRONIC DAILY HEADACHE: ICD-10-CM

## 2021-08-31 DIAGNOSIS — G43.009 MIGRAINE WITHOUT AURA AND WITHOUT STATUS MIGRAINOSUS, NOT INTRACTABLE: ICD-10-CM

## 2021-08-31 RX ORDER — AMITRIPTYLINE HYDROCHLORIDE 10 MG/1
TABLET, FILM COATED ORAL
Qty: 150 TABLET | Refills: 4 | Status: SHIPPED | OUTPATIENT
Start: 2021-08-31 | End: 2022-03-11

## 2021-09-20 ENCOUNTER — OFFICE VISIT (OUTPATIENT)
Dept: DERMATOLOGY | Facility: CLINIC | Age: 46
End: 2021-09-20
Payer: COMMERCIAL

## 2021-09-20 VITALS — BODY MASS INDEX: 21.7 KG/M2 | WEIGHT: 126.4 LBS | TEMPERATURE: 98.8 F

## 2021-09-20 DIAGNOSIS — B07.9 VERRUCA VULGARIS: Primary | ICD-10-CM

## 2021-09-20 PROCEDURE — 17110 DESTRUCTION B9 LES UP TO 14: CPT | Performed by: DERMATOLOGY

## 2021-09-20 RX ORDER — CANDIDA ALBICANS 1000 [PNU]/ML
0.1 INJECTION, SOLUTION INTRADERMAL ONCE
Status: COMPLETED | OUTPATIENT
Start: 2021-09-20 | End: 2021-09-20

## 2021-09-20 RX ADMIN — CANDIDA ALBICANS 0.1 ML: 1000 INJECTION, SOLUTION INTRADERMAL at 10:46

## 2021-09-20 NOTE — PROGRESS NOTES
[facility] Follow Up Note    Patient Name: Suresh Cline  Encounter Date: 09/20/2021    Today's Chief Concerns:  Patrick Hallman Concern #1:  Follow up Terence     Current Medications:    Current Outpatient Medications:     amitriptyline (ELAVIL) 10 mg tablet, START 10 MG AT BEDTIME INCREASE BY 10 MG EACH WEEK UNTIL GOOD EFFECT ON HEADACHE/PAIN OR REACH 50 MG DAILY, Disp: 150 tablet, Rfl: 4    clobetasol (TEMOVATE) 0 05 % cream, Apply twice a day to areas of rash only for 2-4 weeks  Avoid normal skin, the face and the groin  When skin appears normal, discontinue use , Disp: 60 g, Rfl: 0    magnesium 30 MG tablet, Take 500 mg by mouth daily, Disp: , Rfl:     oxybutynin (DITROPAN-XL) 10 MG 24 hr tablet, Take 10 mg by mouth daily, Disp: , Rfl:     rizatriptan (MAXALT) 10 MG tablet, Take 1 tablet (10 mg total) by mouth once as needed for migraine May repeat in 2 hours if needed  Max 2/24 hours, 9/month , Disp: 9 tablet, Rfl: 6    CONSTITUTIONAL:   Vitals:    09/20/21 1015   Temp: 98 8 °F (37 1 °C)   TempSrc: Tympanic   Weight: 57 3 kg (126 lb 6 4 oz)         Specific Alerts:    Have you been seen by a [facility]ologist in the last 3 years? YES    Are you pregnant or planning to become pregnant? No    Are you currently or planning to be nursing or breast feeding? No    No Known Allergies    May we call your Preferred Phone number to discuss your specific medical information? YES    May we leave a detailed message that includes your specific medical information? YES    Have you traveled outside of the [facility] in the past 3 months? No     Review of Systems:  Have you recently had or currently have any of the following?     · Fever or chills: No  · Night Sweats: No  · Headaches: No  · Weight Gain: No  · Weight Loss: No  · Blurry Vision: No  · Nausea: No  · Vomiting: No  · Diarrhea: No  · Blood in Stool: No  · Abdominal Pain: No  · Itchy Skin: No  · Painful Joints: No  · Swollen Joints: No  · Muscle Pain: No  · Irregular Mole: No  · Sun Burn: No  · Dry Skin: No  · Skin Color Changes: No  · Scar or Keloid: No  · Cold Sores/Fever Blisters: No  · Bacterial Infections/MRSA: No  · Anxiety: No  · Depression: No  · Suicidal or Homicidal Thoughts: No      PSYCH: Normal mood and affect  EYES: Normal conjunctiva  ENT: Normal lips and oral mucosa  CARDIOVASCULAR: No edema  RESPIRATORY: Normal respirations    FULL ORGAN SYSTEM SKIN EXAM (SKIN)   Right Hand/Fingers Normal except as noted below in Assessment   Left Hand/Fingers Normal except as noted below in Assessment     VERRUCA VULGARIS ("Common Warts")    Physical Exam:   Anatomic Location Affected:  Right thumb, Left third digit, right third digit, Right fourth digit    Morphological Description:  4 verrucous skin colored papules     Additional History of Present Condition:  Patient presents today with warts on the fingers  Patient had cryotherapy done on the right and left hand last visit and then on the left hand she had Candida injection  Thinks they have improved slightly  Assessment and Plan:  Based on a thorough discussion of this condition and the management approach to it (including a comprehensive discussion of the known risks, side effects and potential benefits of treatment), the patient (family) agrees to implement the following specific plan:   Cryotherapy done in office today; larger warts were pared down using a 3mm curette   Candida injection done today on the right thumb   Consent obtained and signed for both cryotherapy and candida injection   Follow up in 4 weeks   Instructed patient to obtain over the counter salicylic acid liquid and apply to all warts once daily and keep covered with bandaid if possible    Verruca Vulgaris  A verruca is a common growth of the skin caused by infection by human papilloma virus (HPV)  There are many strains of the virus that cause different types of warts on the body   The virus infects the most superficial layers of the skin, causing increased production of skin cells and thickening  Warts can be spread through direct contact with infected skin and may spread to other parts of the body if scratched or picked  A verruca is more commonly called a "wart " Warts are particularly common in school-aged children but can arise at any age  Patients who have a history of eczema are especially prone due to impaired skin barrier  Those taking immunosuppressive drugs or with HIV infections may experience prolonged symptoms despite treatment  Warts generally have a rough surface with a tiny black dot sometimes observed in the middle of each scaly spot  They can range in size from a small bump to large scaly growths  Common warts are often found on the backs of fingers or toes, around the nails, and on the knees  Plantar warts can grow inwardly on the soles of the feet causing pain  There are many possible ways to treat warts and sometimes several different treatments are needed to get the warts to go away completely  There is no single perfect treatment for warts, and successful treatment can take many months  In-office treatments usually require multiple visits, and include:  1) Cryotherapy  a cold spray with liquid nitrogen will destroy the infected cells but may lead to discomfort and blistering  It may also leave a permanent white asad or scar  2) Electrosurgery (curettage and cautery) can be used for large resistant warts which involves shaving the growth down and burning the base  It is performed under local anesthesia and may leave a permanent scar    3) Candida (yeast) antigen injections  These are extracts of the common yeast (Candida) that cannot cause an infection  The medication is injected into/under the wart  It is thought to stimulate the immune system to recognize the wart virus and attack it  Multiple injections are often needed about one month apart      There are also several at-home wart treatments:    1) Soak the warts in warm water for 5 minutes every night followed by gentle filing with a nail file or pumice stone  2    2) Topical salicylic acid or similar compounds work by removing the dead surface skin cells  a  Apply the medicine directly to the wart, wait for it to dry completely, then cover with duct tape overnight   b  Repeat until the wart is gone, which can take 2-4 months  c  Do not use on the face or groin area   d  If the wart paint makes the skin sore, stop treatment until the discomfort has settled, then recommence as above   e  Take care to keep the chemical off normal skin  3) Podophyllin is a cytotoxic agent used in some products and must not be used in pregnancy or women considering pregnancy  4) Some prescription medications include   a  Topical retinoids (adapalene, tretinoin, tazarotene), 5-fluorouracil (Efudex) or imiquimod (Aldara) creams are sometimes used to treat flat warts or warts on the face and other sensitive anatomical areas  They are usually applied directly to the warts once a day for 2-4 months and can be irritating  These treatments should only be used as directed by your health care provider  b  Systemic treatment with oral cimetidine (Tagamet) may help boost the immune system against the wart virus in patients, some of the time  Initiation of cimetidine therapy should ONLY be done under the supervision of your health care provider, who can discuss possible side effects and drug-to-drug interactions of this specific treatment  PROCEDURE:  DESTRUCTION OF BENIGN LESIONS  After a thorough discussion of treatment options and risk/benefits/alternatives (including but not limited to local pain, scarring, dyspigmentation, blistering, and possible superinfection), verbal and written consent were obtained and the aforementioned lesions were treated on with cryotherapy using liquid nitrogen x 1 cycle for 5-10 seconds       TOTAL NUMBER of 4 lesions were treated today on the ANATOMIC LOCATION: Right thumb, Left third digit, right third digit, Right fourth digit  The patient tolerated the procedure well, and after-care instructions were provided  PROCEDURE:  INTRALESIONAL SHERRY ANTIGEN    Purpose: Candida antigen is used "off label" as an immunotherapy agent in the treatment of warts  This is widely used technique endorsed by many pediatric dermatologists because of its utility in treating multiple lesions with minimal pain and discomfort and resulting sequelae to the treated areas  Indications: It is used "off label" for the treatment of warts  Potential Side Effects: The patient signifies understanding that Candida antigen injection can potentially cause early and/or delayed adverse effects such as:    Pain    Local immune response    Bleeding    Skin discoloration   Swelling    Flu-like illness with increased lymphnodes   Serious allergic reaction (anaphylaxis)    PROCEDURE NOTE:  After verbal and written consent were obtained, the to-be-treated area was wiped and cleaned with rubbing alcohol 70%  Then, a total of 0 1 mL of refrigerated Candida antigen (Lot# ; Expiration 08/06/2022, NDC#: [id number]) was injected intralesionally into a total of 1 lesion/s on the following anatomic areas:  Right thumb  using a 1-mL tuberculin syringe and a 30-gauge needle  There was less than 1 mL of blood loss and little to no discomfort  The area was bandaged with a Band-aid  The patient tolerated the procedure well and remained in the office for observation    With no signs of an adverse reaction, the patient was eventually discharged from clinic      Scribe Attestation    I,:  Maynor Avila MA am acting as a scribe while in the presence of the attending physician :       I,:  Antonietta Thompson MD personally performed the services described in this documentation    as scribed in my presence :         The patient was seen and discussed with Dr Heidi Zamora       RTC: 4 weeks for verruca vulgaris follow-up     Katelyn Clemons  Dermatology PGY-3 Resident Physician

## 2021-09-20 NOTE — PATIENT INSTRUCTIONS
1  VERRUCA VULGARIS ("Common Warts")    Assessment and Plan:  Based on a thorough discussion of this condition and the management approach to it (including a comprehensive discussion of the known risks, side effects and potential benefits of treatment), the patient (family) agrees to implement the following specific plan:   Cryotherapy done in office today; larger warts were pared down using a 3mm curette   Candida injection done today on the right thumb   Consent obtained and signed for both cryotherapy and candida injection   Follow up in 4 weeks   Instructed patient to obtain over the counter salicylic acid liquid and apply to all warts once daily and keep covered with bandaid if possible    Verruca Vulgaris  A verruca is a common growth of the skin caused by infection by human papilloma virus (HPV)  There are many strains of the virus that cause different types of warts on the body  The virus infects the most superficial layers of the skin, causing increased production of skin cells and thickening  Warts can be spread through direct contact with infected skin and may spread to other parts of the body if scratched or picked  A verruca is more commonly called a "wart " Warts are particularly common in school-aged children but can arise at any age  Patients who have a history of eczema are especially prone due to impaired skin barrier  Those taking immunosuppressive drugs or with HIV infections may experience prolonged symptoms despite treatment  Warts generally have a rough surface with a tiny black dot sometimes observed in the middle of each scaly spot  They can range in size from a small bump to large scaly growths  Common warts are often found on the backs of fingers or toes, around the nails, and on the knees  Plantar warts can grow inwardly on the soles of the feet causing pain      There are many possible ways to treat warts and sometimes several different treatments are needed to get the warts to go away completely  There is no single perfect treatment for warts, and successful treatment can take many months  In-office treatments usually require multiple visits, and include:  1) Cryotherapy  a cold spray with liquid nitrogen will destroy the infected cells but may lead to discomfort and blistering  It may also leave a permanent white asad or scar  2) Electrosurgery (curettage and cautery) can be used for large resistant warts which involves shaving the growth down and burning the base  It is performed under local anesthesia and may leave a permanent scar    3) Candida (yeast) antigen injections  These are extracts of the common yeast (Candida) that cannot cause an infection  The medication is injected into/under the wart  It is thought to stimulate the immune system to recognize the wart virus and attack it  Multiple injections are often needed about one month apart  There are also several at-home wart treatments:    1) Soak the warts in warm water for 5 minutes every night followed by gentle filing with a nail file or pumice stone  2    2) Topical salicylic acid or similar compounds work by removing the dead surface skin cells  a  Apply the medicine directly to the wart, wait for it to dry completely, then cover with duct tape overnight   b  Repeat until the wart is gone, which can take 2-4 months  c  Do not use on the face or groin area   d  If the wart paint makes the skin sore, stop treatment until the discomfort has settled, then recommence as above   e  Take care to keep the chemical off normal skin  3) Podophyllin is a cytotoxic agent used in some products and must not be used in pregnancy or women considering pregnancy  4) Some prescription medications include   a  Topical retinoids (adapalene, tretinoin, tazarotene), 5-fluorouracil (Efudex) or imiquimod (Aldara) creams are sometimes used to treat flat warts or warts on the face and other sensitive anatomical areas   They are usually applied directly to the warts once a day for 2-4 months and can be irritating  These treatments should only be used as directed by your health care provider  b  Systemic treatment with oral cimetidine (Tagamet) may help boost the immune system against the wart virus in patients, some of the time  Initiation of cimetidine therapy should ONLY be done under the supervision of your health care provider, who can discuss possible side effects and drug-to-drug interactions of this specific treatment  PROCEDURE:  DESTRUCTION OF BENIGN LESIONS  After a thorough discussion of treatment options and risk/benefits/alternatives (including but not limited to local pain, scarring, dyspigmentation, blistering, and possible superinfection), verbal and written consent were obtained and the aforementioned lesions were treated on with cryotherapy using liquid nitrogen x 1 cycle for 5-10 seconds   TOTAL NUMBER of 4 lesions were treated today on the ANATOMIC LOCATION: Right thumb, Left third digit, right third digit, Right fourth digit  The patient tolerated the procedure well, and after-care instructions were provided  PROCEDURE:  INTRALESIONAL SHERRY ANTIGEN    Purpose: Candida antigen is used "off label" as an immunotherapy agent in the treatment of warts  This is widely used technique endorsed by many pediatric dermatologists because of its utility in treating multiple lesions with minimal pain and discomfort and resulting sequelae to the treated areas  Indications: It is used "off label" for the treatment of warts  Potential Side Effects:  The patient signifies understanding that Candida antigen injection can potentially cause early and/or delayed adverse effects such as:    Pain    Local immune response    Bleeding    Skin discoloration   Swelling    Flu-like illness with increased lymphnodes   Serious allergic reaction (anaphylaxis)    PROCEDURE NOTE:  After verbal and written consent were obtained, the to-be-treated area was wiped and cleaned with rubbing alcohol 70%  Then, a total of 0 1 mL of refrigerated Candida antigen (Lot# ; Expiration 08/06/2022, NDC#: [id number]) was injected intralesionally into a total of 1 lesion/s on the following anatomic areas:  Right thumb  using a 1-mL tuberculin syringe and a 30-gauge needle  There was less than 1 mL of blood loss and little to no discomfort  The area was bandaged with a Band-aid  The patient tolerated the procedure well and remained in the office for observation  With no signs of an adverse reaction, the patient was eventually discharged from clinic

## 2021-11-01 DIAGNOSIS — G43.009 MIGRAINE WITHOUT AURA AND WITHOUT STATUS MIGRAINOSUS, NOT INTRACTABLE: ICD-10-CM

## 2021-11-01 RX ORDER — RIZATRIPTAN BENZOATE 10 MG/1
TABLET ORAL
Qty: 9 TABLET | Refills: 6 | Status: SHIPPED | OUTPATIENT
Start: 2021-11-01 | End: 2022-05-31

## 2021-11-02 ENCOUNTER — OFFICE VISIT (OUTPATIENT)
Dept: DERMATOLOGY | Facility: CLINIC | Age: 46
End: 2021-11-02
Payer: COMMERCIAL

## 2021-11-02 VITALS — HEIGHT: 64 IN | BODY MASS INDEX: 21.85 KG/M2 | TEMPERATURE: 96.8 F | WEIGHT: 128 LBS

## 2021-11-02 DIAGNOSIS — L25.9 CONTACT DERMATITIS, UNSPECIFIED CONTACT DERMATITIS TYPE, UNSPECIFIED TRIGGER: ICD-10-CM

## 2021-11-02 DIAGNOSIS — B07.9 VERRUCA VULGARIS: Primary | ICD-10-CM

## 2021-11-02 PROCEDURE — 17110 DESTRUCTION B9 LES UP TO 14: CPT | Performed by: DERMATOLOGY

## 2021-11-02 PROCEDURE — 99213 OFFICE O/P EST LOW 20 MIN: CPT | Performed by: DERMATOLOGY

## 2021-11-02 RX ORDER — CANDIDA ALBICANS 1000 [PNU]/ML
0.1 INJECTION, SOLUTION INTRADERMAL ONCE
Status: COMPLETED | OUTPATIENT
Start: 2021-11-02 | End: 2021-11-02

## 2021-11-02 RX ADMIN — CANDIDA ALBICANS 0.1 ML: 1000 INJECTION, SOLUTION INTRADERMAL at 09:44

## 2021-11-26 ENCOUNTER — OFFICE VISIT (OUTPATIENT)
Dept: FAMILY MEDICINE CLINIC | Facility: CLINIC | Age: 46
End: 2021-11-26
Payer: COMMERCIAL

## 2021-11-26 VITALS
HEIGHT: 64 IN | BODY MASS INDEX: 22.06 KG/M2 | WEIGHT: 129.2 LBS | HEART RATE: 92 BPM | OXYGEN SATURATION: 99 % | SYSTOLIC BLOOD PRESSURE: 116 MMHG | DIASTOLIC BLOOD PRESSURE: 80 MMHG | TEMPERATURE: 98.2 F

## 2021-11-26 DIAGNOSIS — Z23 IMMUNIZATION DUE: ICD-10-CM

## 2021-11-26 DIAGNOSIS — Z00.00 ANNUAL PHYSICAL EXAM: Primary | ICD-10-CM

## 2021-11-26 DIAGNOSIS — G43.009 MIGRAINE WITHOUT AURA AND WITHOUT STATUS MIGRAINOSUS, NOT INTRACTABLE: ICD-10-CM

## 2021-11-26 DIAGNOSIS — E01.0 THYROMEGALY: ICD-10-CM

## 2021-11-26 PROBLEM — C91.01 ALL (ACUTE LYMPHOID LEUKEMIA) IN REMISSION (HCC): Status: RESOLVED | Noted: 2020-11-19 | Resolved: 2021-11-26

## 2021-11-26 PROCEDURE — 3008F BODY MASS INDEX DOCD: CPT | Performed by: FAMILY MEDICINE

## 2021-11-26 PROCEDURE — 4004F PT TOBACCO SCREEN RCVD TLK: CPT | Performed by: FAMILY MEDICINE

## 2021-11-26 PROCEDURE — 99396 PREV VISIT EST AGE 40-64: CPT | Performed by: FAMILY MEDICINE

## 2021-11-26 PROCEDURE — 3725F SCREEN DEPRESSION PERFORMED: CPT | Performed by: FAMILY MEDICINE

## 2021-11-26 PROCEDURE — 90471 IMMUNIZATION ADMIN: CPT

## 2021-11-26 PROCEDURE — 90686 IIV4 VACC NO PRSV 0.5 ML IM: CPT

## 2021-12-07 ENCOUNTER — OFFICE VISIT (OUTPATIENT)
Dept: NEUROLOGY | Facility: CLINIC | Age: 46
End: 2021-12-07
Payer: COMMERCIAL

## 2021-12-07 VITALS
DIASTOLIC BLOOD PRESSURE: 60 MMHG | HEIGHT: 64 IN | WEIGHT: 125 LBS | SYSTOLIC BLOOD PRESSURE: 100 MMHG | BODY MASS INDEX: 21.34 KG/M2 | HEART RATE: 86 BPM

## 2021-12-07 DIAGNOSIS — G43.009 MIGRAINE WITHOUT AURA AND WITHOUT STATUS MIGRAINOSUS, NOT INTRACTABLE: Primary | ICD-10-CM

## 2021-12-07 PROCEDURE — 3008F BODY MASS INDEX DOCD: CPT | Performed by: PSYCHIATRY & NEUROLOGY

## 2021-12-07 PROCEDURE — 4004F PT TOBACCO SCREEN RCVD TLK: CPT | Performed by: PSYCHIATRY & NEUROLOGY

## 2021-12-07 PROCEDURE — 99215 OFFICE O/P EST HI 40 MIN: CPT | Performed by: PSYCHIATRY & NEUROLOGY

## 2021-12-07 RX ORDER — TOPIRAMATE 25 MG/1
TABLET ORAL
Qty: 120 TABLET | Refills: 4 | Status: SHIPPED | OUTPATIENT
Start: 2021-12-07 | End: 2022-03-11

## 2021-12-27 ENCOUNTER — TELEPHONE (OUTPATIENT)
Dept: FAMILY MEDICINE CLINIC | Facility: CLINIC | Age: 46
End: 2021-12-27

## 2022-02-24 ENCOUNTER — TELEPHONE (OUTPATIENT)
Dept: NEUROLOGY | Facility: CLINIC | Age: 47
End: 2022-02-24

## 2022-02-24 NOTE — TELEPHONE ENCOUNTER
Called and left a voicemail for patient - Please call back to confirm upcoming appointment with Dr Praful Wolf  Provided patient with apt date, time and location  Informed patient that check in is at least 15 minutes prior to apt time

## 2022-03-04 NOTE — TELEPHONE ENCOUNTER
Called and left a voicemail for patient - Please call back to confirm upcoming appointment with Dr Evelyn Medina  Provided patient with apt date, time and location  Informed patient that check in is at least 15 minutes prior to apt time
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
## 2022-03-11 ENCOUNTER — OFFICE VISIT (OUTPATIENT)
Dept: NEUROLOGY | Facility: CLINIC | Age: 47
End: 2022-03-11
Payer: COMMERCIAL

## 2022-03-11 VITALS
SYSTOLIC BLOOD PRESSURE: 110 MMHG | BODY MASS INDEX: 21.17 KG/M2 | DIASTOLIC BLOOD PRESSURE: 62 MMHG | HEIGHT: 64 IN | HEART RATE: 75 BPM | WEIGHT: 124 LBS

## 2022-03-11 DIAGNOSIS — G43.009 MIGRAINE WITHOUT AURA AND WITHOUT STATUS MIGRAINOSUS, NOT INTRACTABLE: Primary | ICD-10-CM

## 2022-03-11 PROCEDURE — 4004F PT TOBACCO SCREEN RCVD TLK: CPT | Performed by: PSYCHIATRY & NEUROLOGY

## 2022-03-11 PROCEDURE — 3008F BODY MASS INDEX DOCD: CPT | Performed by: PSYCHIATRY & NEUROLOGY

## 2022-03-11 PROCEDURE — 99214 OFFICE O/P EST MOD 30 MIN: CPT | Performed by: PSYCHIATRY & NEUROLOGY

## 2022-03-11 NOTE — PATIENT INSTRUCTIONS
Follow up with PCP regarding thyroid, thyroid ultrasound was ordered a year ago, do you need it? And if TSH comes back normal, would she recommend rechecking the other thyroid labs that were abnormal a year ago  Headache/migraine treatment:   Abortive medications (for immediate treatment of a headache): It is ok to take ibuprofen, acetaminophen or naproxen (Advil, Tylenol,  Aleve, Excedrin) if they help your headaches you should limit these to No more than 3 times a week to avoid medication overuse/rebound headaches  For your more moderate to severe migraines take this medication early   Maxalt (rizatriptan) 10mg tabs - take one at the onset of headache  May repeat one time after 1-2 hours if pain has not resolved  (Max 2 a day and 9 a month)       Over the counter preventive supplements for headaches/migraines (if you try, try for 3 months straight)  (to take every day to help prevent headaches - not to take at the time of headache):  [x] Magnesium 400mg daily (If any diarrhea or upset stomach, decrease dose  as tolerated)    Prescription preventive medications for headaches/migraines   (to take every day to help prevent headaches - not to take at the time of headache):  [x] topiramate - for now just take morning dose for 1-2 weeks     [x] Emgality/Galcanezumab - the 1st dose is 240 mg loading dose of 2 consecutive 120 mg injections  Thereafter, 120 mg injections every 30 days     READ INSTRUCTIONS that come with the medication  REFRIGERATE  Keep out of direct sunlight  Prior to administration, allow to come to room temperature for 30 minutes  Do not warm using a heat source (eg, microwave or hot water)  Do not shake  Administer in preferably abdomen (avoiding 2 inches around the navel), thigh, upper arm, or buttocks avoiding areas of skin that are tender, bruised, red or hard  Deliver entire contents of single-use prefilled pen or syringe          *Typically these types of medications take time untill you see the benefit, although some may see improvement in days, often it may take weeks, especially if the medication is being titrated up to a beneficial level  Please contact us if there are any concerns or questions regarding the medication  Lifestyle Recommendations:  [x] SLEEP - Maintain a regular sleep schedule: Adults need at least 7-8 hours of uninterrupted a night  Maintain good sleep hygiene:  Going to bed and waking up at consistent times, avoiding excessive daytime naps, avoiding caffeinated beverages in the evening, avoid excessive stimulation in the evening and generally using bed primarily for sleeping  One hour before bedtime would recommend turning lights down lower, decreasing your activity (may read quietly, listen to music at a low volume)  When you get into bed, should eliminate all technology (no texting, emailing, playing with your phone, iPad or tablet in bed)  [x] HYDRATION - Maintain good hydration  Drink  2L of fluid a day (4 typical small water bottles)  [x] DIET - Maintain good nutrition  In particular don't skip meals and try and eat healthy balanced meals regularly  [x] TRIGGERS - Look for other triggers and avoid them: Limit caffeine to 1-2 cups a day or less  Avoid dietary triggers that you have noticed bring on your headaches (this could include aged cheese, peanuts, MSG, aspartame and nitrates)  [x] EXERCISE - physical exercise as we all know is good for you in many ways, and not only is good for your heart, but also is beneficial for your mental health, cognitive health and  chronic pain/headaches  I would encourage at the least 5 days of physical exercise weekly for at least 30 minutes  Education and Follow-up  [x] Please call with any questions or concerns  Of course if any new concerning symptoms go to the emergency department    [x] Follow up 3 months, sooner if needed

## 2022-05-29 DIAGNOSIS — G43.009 MIGRAINE WITHOUT AURA AND WITHOUT STATUS MIGRAINOSUS, NOT INTRACTABLE: ICD-10-CM

## 2022-05-31 RX ORDER — RIZATRIPTAN BENZOATE 10 MG/1
TABLET ORAL
Qty: 9 TABLET | Refills: 6 | Status: SHIPPED | OUTPATIENT
Start: 2022-05-31

## 2022-06-22 ENCOUNTER — TELEPHONE (OUTPATIENT)
Dept: NEUROLOGY | Facility: CLINIC | Age: 47
End: 2022-06-22

## 2022-06-22 NOTE — TELEPHONE ENCOUNTER
Called and left message for patient confirming their upcoming appointment with Dr Warren Leung on 7/1/22

## 2022-07-01 ENCOUNTER — OFFICE VISIT (OUTPATIENT)
Dept: NEUROLOGY | Facility: CLINIC | Age: 47
End: 2022-07-01
Payer: COMMERCIAL

## 2022-07-01 VITALS
HEART RATE: 82 BPM | HEIGHT: 64 IN | TEMPERATURE: 98.2 F | WEIGHT: 122 LBS | SYSTOLIC BLOOD PRESSURE: 104 MMHG | DIASTOLIC BLOOD PRESSURE: 66 MMHG | BODY MASS INDEX: 20.83 KG/M2

## 2022-07-01 DIAGNOSIS — G43.009 MIGRAINE WITHOUT AURA AND WITHOUT STATUS MIGRAINOSUS, NOT INTRACTABLE: Primary | ICD-10-CM

## 2022-07-01 PROCEDURE — 99214 OFFICE O/P EST MOD 30 MIN: CPT | Performed by: PSYCHIATRY & NEUROLOGY

## 2022-07-01 RX ORDER — COVID-19 ANTIGEN TEST
KIT MISCELLANEOUS
COMMUNITY
Start: 2022-06-21

## 2022-07-01 RX ORDER — UBIDECARENONE 75 MG
CAPSULE ORAL DAILY
COMMUNITY

## 2022-07-01 RX ORDER — RIBOFLAVIN (VITAMIN B2) 100 MG
100 TABLET ORAL DAILY
COMMUNITY

## 2022-07-01 NOTE — PROGRESS NOTES
Review of Systems   Constitutional: Negative for appetite change and fever  HENT: Negative  Negative for hearing loss, tinnitus, trouble swallowing and voice change  Eyes: Negative  Negative for photophobia and pain  Respiratory: Negative  Negative for shortness of breath  Cardiovascular: Negative  Negative for palpitations  Gastrointestinal: Negative  Negative for nausea and vomiting  Endocrine: Negative  Negative for cold intolerance  Genitourinary: Negative  Negative for dysuria, frequency and urgency  Musculoskeletal: Negative  Negative for myalgias and neck pain  Skin: Negative  Negative for rash  Neurological: Negative  Negative for dizziness, tremors, seizures, syncope, facial asymmetry, speech difficulty, weakness, light-headedness, numbness and headaches  Hematological: Negative  Does not bruise/bleed easily  Psychiatric/Behavioral: Negative  Negative for confusion, hallucinations and sleep disturbance  All other systems reviewed and are negative

## 2022-07-01 NOTE — PATIENT INSTRUCTIONS
Headache/migraine treatment:   Abortive medications (for immediate treatment of a headache): It is ok to take ibuprofen, acetaminophen or naproxen (Advil, Tylenol,  Aleve, Excedrin) if they help your headaches you should limit these to No more than 3 times a week to avoid medication overuse/rebound headaches  For your more moderate to severe migraines take this medication early   Maxalt (rizatriptan) 10mg tabs - take one at the onset of headache  May repeat one time after 1-2 hours if pain has not resolved  (Max 2 a day and 9 a month)       Over the counter preventive supplements for headaches/migraines (if you try, try for 3 months straight)  (to take every day to help prevent headaches - not to take at the time of headache):  [x] Magnesium 400mg daily (If any diarrhea or upset stomach, decrease dose  as tolerated)    Prescription preventive medications for headaches/migraines   (to take every day to help prevent headaches - not to take at the time of headache):  [x] Emgality/Galcanezumab - 120 mg injections every 30 days     READ INSTRUCTIONS that come with the medication  REFRIGERATE  Keep out of direct sunlight  Prior to administration, allow to come to room temperature for 30 minutes  Do not warm using a heat source (eg, microwave or hot water)  Do not shake  Administer in preferably abdomen (avoiding 2 inches around the navel), thigh, upper arm, or buttocks avoiding areas of skin that are tender, bruised, red or hard  Deliver entire contents of single-use prefilled pen or syringe  *Typically these types of medications take time untill you see the benefit, although some may see improvement in days, often it may take weeks, especially if the medication is being titrated up to a beneficial level  Please contact us if there are any concerns or questions regarding the medication         Lifestyle Recommendations:  [x] SLEEP - Maintain a regular sleep schedule: Adults need at least 7-8 hours of uninterrupted a night  Maintain good sleep hygiene:  Going to bed and waking up at consistent times, avoiding excessive daytime naps, avoiding caffeinated beverages in the evening, avoid excessive stimulation in the evening and generally using bed primarily for sleeping  One hour before bedtime would recommend turning lights down lower, decreasing your activity (may read quietly, listen to music at a low volume)  When you get into bed, should eliminate all technology (no texting, emailing, playing with your phone, iPad or tablet in bed)  [x] HYDRATION - Maintain good hydration  Drink  2L of fluid a day (4 typical small water bottles)  [x] DIET - Maintain good nutrition  In particular don't skip meals and try and eat healthy balanced meals regularly  [x] TRIGGERS - Look for other triggers and avoid them: Limit caffeine to 1-2 cups a day or less  Avoid dietary triggers that you have noticed bring on your headaches (this could include aged cheese, peanuts, MSG, aspartame and nitrates)  [x] EXERCISE - physical exercise as we all know is good for you in many ways, and not only is good for your heart, but also is beneficial for your mental health, cognitive health and  chronic pain/headaches  I would encourage at the least 5 days of physical exercise weekly for at least 30 minutes  Education and Follow-up  [x] Please call with any questions or concerns  Of course if any new concerning symptoms go to the emergency department    [x] Follow up 6 months, sooner if needed
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
## 2022-08-06 DIAGNOSIS — G43.009 MIGRAINE WITHOUT AURA AND WITHOUT STATUS MIGRAINOSUS, NOT INTRACTABLE: ICD-10-CM

## 2022-08-06 NOTE — TELEPHONE ENCOUNTER
Received fax from [facility]  Pt requesting Emgality script, 90 day supply be sent to their pharmacy  Rx entered   Pls review and sign off    thanks

## 2022-12-02 ENCOUNTER — APPOINTMENT (OUTPATIENT)
Dept: LAB | Facility: CLINIC | Age: 47
End: 2022-12-02

## 2022-12-02 ENCOUNTER — OFFICE VISIT (OUTPATIENT)
Dept: FAMILY MEDICINE CLINIC | Facility: CLINIC | Age: 47
End: 2022-12-02

## 2022-12-02 VITALS
BODY MASS INDEX: 21.17 KG/M2 | RESPIRATION RATE: 16 BRPM | WEIGHT: 124 LBS | SYSTOLIC BLOOD PRESSURE: 116 MMHG | HEIGHT: 64 IN | TEMPERATURE: 97.8 F | HEART RATE: 86 BPM | DIASTOLIC BLOOD PRESSURE: 70 MMHG

## 2022-12-02 DIAGNOSIS — Z00.00 ANNUAL PHYSICAL EXAM: ICD-10-CM

## 2022-12-02 DIAGNOSIS — J34.89 SINUS PRESSURE: ICD-10-CM

## 2022-12-02 DIAGNOSIS — E01.0 THYROMEGALY: ICD-10-CM

## 2022-12-02 DIAGNOSIS — Z12.11 SCREEN FOR COLON CANCER: ICD-10-CM

## 2022-12-02 DIAGNOSIS — Z12.31 ENCOUNTER FOR SCREENING MAMMOGRAM FOR BREAST CANCER: ICD-10-CM

## 2022-12-02 DIAGNOSIS — Z00.00 ANNUAL PHYSICAL EXAM: Primary | ICD-10-CM

## 2022-12-02 LAB
ALBUMIN SERPL BCP-MCNC: 4 G/DL (ref 3.5–5)
ALP SERPL-CCNC: 47 U/L (ref 46–116)
ALT SERPL W P-5'-P-CCNC: 17 U/L (ref 12–78)
ANION GAP SERPL CALCULATED.3IONS-SCNC: 5 MMOL/L (ref 4–13)
AST SERPL W P-5'-P-CCNC: 13 U/L (ref 5–45)
BILIRUB SERPL-MCNC: 0.47 MG/DL (ref 0.2–1)
BUN SERPL-MCNC: 12 MG/DL (ref 5–25)
CALCIUM SERPL-MCNC: 9.1 MG/DL (ref 8.3–10.1)
CHLORIDE SERPL-SCNC: 110 MMOL/L (ref 96–108)
CHOLEST SERPL-MCNC: 197 MG/DL
CO2 SERPL-SCNC: 25 MMOL/L (ref 21–32)
CREAT SERPL-MCNC: 0.78 MG/DL (ref 0.6–1.3)
ERYTHROCYTE [DISTWIDTH] IN BLOOD BY AUTOMATED COUNT: 13 % (ref 11.6–15.1)
GFR SERPL CREATININE-BSD FRML MDRD: 90 ML/MIN/1.73SQ M
GLUCOSE P FAST SERPL-MCNC: 98 MG/DL (ref 65–99)
HCT VFR BLD AUTO: 43.5 % (ref 34.8–46.1)
HCV AB SER QL: NORMAL
HDLC SERPL-MCNC: 67 MG/DL
HGB BLD-MCNC: 13.7 G/DL (ref 11.5–15.4)
LDLC SERPL CALC-MCNC: 121 MG/DL (ref 0–100)
MCH RBC QN AUTO: 30.4 PG (ref 26.8–34.3)
MCHC RBC AUTO-ENTMCNC: 31.5 G/DL (ref 31.4–37.4)
MCV RBC AUTO: 97 FL (ref 82–98)
PLATELET # BLD AUTO: 255 THOUSANDS/UL (ref 149–390)
PMV BLD AUTO: 10.9 FL (ref 8.9–12.7)
POTASSIUM SERPL-SCNC: 4 MMOL/L (ref 3.5–5.3)
PROT SERPL-MCNC: 7.1 G/DL (ref 6.4–8.4)
RBC # BLD AUTO: 4.5 MILLION/UL (ref 3.81–5.12)
SODIUM SERPL-SCNC: 140 MMOL/L (ref 135–147)
TRIGL SERPL-MCNC: 46 MG/DL
TSH SERPL DL<=0.05 MIU/L-ACNC: 1.08 UIU/ML (ref 0.45–4.5)
WBC # BLD AUTO: 10.67 THOUSAND/UL (ref 4.31–10.16)

## 2022-12-02 RX ORDER — AMOXICILLIN AND CLAVULANATE POTASSIUM 875; 125 MG/1; MG/1
1 TABLET, FILM COATED ORAL EVERY 12 HOURS SCHEDULED
Qty: 14 TABLET | Refills: 0 | Status: SHIPPED | OUTPATIENT
Start: 2022-12-02 | End: 2022-12-09

## 2022-12-02 RX ORDER — BENZONATATE 100 MG/1
100 CAPSULE ORAL 3 TIMES DAILY PRN
Qty: 20 CAPSULE | Refills: 0 | Status: SHIPPED | OUTPATIENT
Start: 2022-12-02

## 2022-12-02 NOTE — PATIENT INSTRUCTIONS

## 2022-12-02 NOTE — PROGRESS NOTES
[address]    NAME: Ghassan Hernandez  AGE: 52 y o  SEX: female  : 1975   DATE: 2022     Assessment and Plan:     Problem List Items Addressed This Visit    None  Visit Diagnoses     Annual physical exam    -  Primary    Relevant Orders    Lipid Panel with Direct LDL reflex    CBC    Comprehensive metabolic panel    Hepatitis C antibody    Thyromegaly        Relevant Orders    TSH, 3rd generation with Free T4 reflex    Thyroid Antibodies Panel    Screen for colon cancer        Relevant Orders    Ambulatory referral for Colonoscopy    Encounter for screening mammogram for breast cancer        Relevant Orders    Mammo screening bilateral w 3d & cad    Sinus pressure        Relevant Medications    benzonatate (TESSALON PERLES) 100 mg capsule    amoxicillin-clavulanate (AUGMENTIN) 875-125 mg per tablet        Immunizations and preventive care screenings were discussed with patient today  Appropriate education was printed on patient's after visit summary  Counseling:  Alcohol/drug use: discussed moderation in alcohol intake, the recommendations for healthy alcohol use, and avoidance of illicit drug use  Dental Health: discussed importance of regular tooth brushing, flossing, and dental visits  Exercise: the importance of regular exercise/physical activity was discussed  Recommend exercise 3-5 times per week for at least 30 minutes  Depression Screening and Follow-up Plan: Patient was screened for depression during today's encounter  They screened negative with a PHQ-2 score of 0  Tobacco Cessation Counseling: Tobacco cessation counseling was provided  The patient is sincerely urged to quit consumption of tobacco  She is ready to quit tobacco  Medication options discussed           Return in about 1 year (around 2023) for Annual physical      Chief Complaint:     Chief Complaint   Patient presents with   • Annual Exam      History of Present Illness:     Adult Annual Physical   Patient here for a comprehensive physical exam  The patient reports no problems  Diet and Physical Activity  Diet/Nutrition: well balanced diet  Exercise: moderate cardiovascular exercise  Depression Screening  PHQ-2/9 Depression Screening    Little interest or pleasure in doing things: 0 - not at all  Feeling down, depressed, or hopeless: 0 - not at all  PHQ-2 Score: 0  PHQ-2 Interpretation: Negative depression screen       General Health  Sleep: sleeps well  Hearing: normal - bilateral   Vision: most recent eye exam >1 year ago and wears glasses  Dental: regular dental visits  /GYN Health  Patient is: postmenopausal  Last menstrual period: No LMP recorded  Review of Systems:     Review of Systems   Constitutional: Negative for activity change, chills and fever  HENT: Positive for postnasal drip and rhinorrhea  Negative for congestion and sore throat  Eyes: Negative for visual disturbance  Respiratory: Positive for cough  Negative for shortness of breath and wheezing  Cardiovascular: Negative for chest pain and palpitations  Gastrointestinal: Negative for abdominal pain, blood in stool, constipation, diarrhea, nausea and vomiting  Genitourinary: Negative for dysuria  Musculoskeletal: Negative for arthralgias and myalgias  Skin: Negative for rash  Neurological: Negative for dizziness, weakness and headaches  All other systems reviewed and are negative       Past Medical History:     Past Medical History:   Diagnosis Date   • Acute lymphocytic leukemia ([facility] [address] ) 1983   • ALL (acute lymphoid leukemia) in remission ([facility] [address] ) 11/19/2020   • Pilar cyst    • Verruca       Past Surgical History:     Past Surgical History:   Procedure Laterality Date   • CYST REMOVAL     • WISDOM TOOTH EXTRACTION        Social History:     Social History     Socioeconomic History   • Marital status: /Civil Union     Spouse name: Emir   • Number of children: 0   • Years of education: None   • Highest education level: None   Occupational History   • None   Tobacco Use   • Smoking status: Every Day     Packs/day: 0 25     Years: 28 00     Pack years: 7 00     Types: Cigarettes   • Smokeless tobacco: Never   Vaping Use   • Vaping Use: Never used   Substance and Sexual Activity   • Alcohol use: Yes     Comment: social   • Drug use: Never   • Sexual activity: Yes     Partners: Male     Birth control/protection: None   Other Topics Concern   • None   Social History Narrative    Menses irregular   No menopausal symptoms    No pap, mammo      Social Determinants of Health     Financial Resource Strain: Not on file   Food Insecurity: Not on file   Transportation Needs: Not on file   Physical Activity: Not on file   Stress: Not on file   Social Connections: Not on file   Intimate Partner Violence: Not on file   Housing Stability: Not on file      Family History:     Family History   Problem Relation Age of Onset   • Arthritis Mother    • Hypertension Mother    • Prostate cancer Father    • No Known Problems Sister    • No Known Problems Brother    • Ovarian cancer Maternal Grandmother    • Heart disease Maternal Grandfather    • Hypertension Maternal Grandfather    • Hyperlipidemia Maternal Grandfather    • Melanoma Paternal Grandmother    • Dizziness Paternal Grandmother    • Diabetes Paternal Grandmother    • Heart disease Paternal Grandfather    • Hyperlipidemia Paternal Grandfather    • No Known Problems Sister    • Asthma Neg Hx    • COPD Neg Hx    • Colon cancer Neg Hx    • Breast cancer Neg Hx    • Depression Neg Hx    • Mental illness Neg Hx       Current Medications:     Current Outpatient Medications   Medication Sig Dispense Refill   • amoxicillin-clavulanate (AUGMENTIN) 875-125 mg per tablet Take 1 tablet by mouth every 12 (twelve) hours for 7 days 14 tablet 0   • Ascorbic Acid (vitamin C) 100 MG tablet Take 100 mg by mouth daily     • benzonatate (TESSALON PERLES) 100 mg capsule Take 1 capsule (100 mg total) by mouth 3 (three) times a day as needed for cough 20 capsule 0   • clobetasol (TEMOVATE) 0 05 % cream Apply twice a day to areas of rash only for 2-4 weeks  Avoid normal skin, the face and the groin  When skin appears normal, discontinue use  60 g 0   • cyanocobalamin (VITAMIN B-12) 100 mcg tablet Take by mouth daily     • Galcanezumab-gnlm 120 MG/ML SOAJ Inject 120 mg under the skin every 30 (thirty) days Following the first month loading dose of 2 pens  3 mL 4   • magnesium 30 MG tablet Take 500 mg by mouth daily     • oxybutynin (DITROPAN-XL) 10 MG 24 hr tablet Take 10 mg by mouth daily     • rizatriptan (MAXALT) 10 MG tablet take 1 tablet by mouth AT ONSET OF HEADACHE may repeat in 2 hours IF headache PERSISTS maximum daily dose of 2 tablets ( 20 milligrams ) every 24 hours 9 TABS PER MONTH 9 tablet 6     No current facility-administered medications for this visit  Allergies:     No Known Allergies   Physical Exam:     /70 (BP Location: Left arm, Patient Position: Sitting, Cuff Size: Adult)   Pulse 86   Temp 97 8 °F (36 6 °C)   Resp 16   Ht 5' 4" (1 626 m)   Wt 56 2 kg (124 lb)   BMI 21 28 kg/m²     Physical Exam  Vitals and nursing note reviewed  Constitutional:       General: She is not in acute distress  Appearance: She is well-developed  She is not ill-appearing  HENT:      Head: Normocephalic and atraumatic  Comments: Full TM bilaterally without obvious effusion     Right Ear: Tympanic membrane, ear canal and external ear normal  No middle ear effusion  Left Ear: Tympanic membrane, ear canal and external ear normal   No middle ear effusion  Nose: Nose normal  No congestion or rhinorrhea  Mouth/Throat:      Lips: Pink  Mouth: Mucous membranes are moist       Pharynx: Oropharynx is clear  Uvula midline  No oropharyngeal exudate  Tonsils: No tonsillar exudate        Comments: PND  Eyes:      General: Lids are normal       Extraocular Movements: Extraocular movements intact  Conjunctiva/sclera: Conjunctivae normal       Pupils: Pupils are equal, round, and reactive to light  Neck:      Thyroid: No thyromegaly  Trachea: No tracheal deviation  Cardiovascular:      Rate and Rhythm: Normal rate and regular rhythm  Pulses: Normal pulses  Heart sounds: Normal heart sounds, S1 normal and S2 normal  No murmur heard  Pulmonary:      Effort: Pulmonary effort is normal  No respiratory distress  Breath sounds: Normal breath sounds  No decreased breath sounds, wheezing, rhonchi or rales  Abdominal:      General: Bowel sounds are normal  There is no distension  Palpations: Abdomen is soft  Tenderness: There is no abdominal tenderness  Musculoskeletal:      Right lower leg: No edema  Left lower leg: No edema  Lymphadenopathy:      Cervical: No cervical adenopathy  Skin:     General: Skin is warm and dry  Capillary Refill: Capillary refill takes less than 2 seconds  Neurological:      Mental Status: She is alert and oriented to person, place, and time  Cranial Nerves: Cranial nerves are intact  Deep Tendon Reflexes: Reflexes normal       Reflex Scores:       Patellar reflexes are 2+ on the right side and 2+ on the left side  Psychiatric:         Attention and Perception: Attention normal          Mood and Affect: Mood normal          Thought Content: Thought content does not include suicidal ideation            Michelle Vázquez MD  [facility]

## 2022-12-03 LAB
THYROGLOB AB SERPL-ACNC: 3 IU/ML (ref 0–0.9)
THYROPEROXIDASE AB SERPL-ACNC: 64 IU/ML (ref 0–34)

## 2023-01-06 ENCOUNTER — TELEPHONE (OUTPATIENT)
Dept: NEUROLOGY | Facility: CLINIC | Age: 48
End: 2023-01-06

## 2023-01-06 NOTE — TELEPHONE ENCOUNTER
Called patient and left voicemail to confirm their upcoming appointment with Dr Clovis Ceballos  Informed patient about arriving in the [place] location 15 minutes prior to appointment to get checked in and go over chart

## 2023-01-13 ENCOUNTER — OFFICE VISIT (OUTPATIENT)
Dept: NEUROLOGY | Facility: CLINIC | Age: 48
End: 2023-01-13

## 2023-01-13 VITALS
HEART RATE: 87 BPM | SYSTOLIC BLOOD PRESSURE: 117 MMHG | HEIGHT: 64 IN | BODY MASS INDEX: 22.36 KG/M2 | WEIGHT: 131 LBS | DIASTOLIC BLOOD PRESSURE: 56 MMHG

## 2023-01-13 DIAGNOSIS — G43.009 MIGRAINE WITHOUT AURA AND WITHOUT STATUS MIGRAINOSUS, NOT INTRACTABLE: Primary | ICD-10-CM

## 2023-01-13 RX ORDER — RIZATRIPTAN BENZOATE 10 MG/1
10 TABLET ORAL ONCE AS NEEDED
Qty: 9 TABLET | Refills: 12 | Status: SHIPPED | OUTPATIENT
Start: 2023-01-13

## 2023-01-13 RX ORDER — DIVALPROEX SODIUM 250 MG/1
TABLET, DELAYED RELEASE ORAL
Qty: 10 TABLET | Refills: 0 | Status: SHIPPED | OUTPATIENT
Start: 2023-01-13

## 2023-01-13 NOTE — PROGRESS NOTES
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 tinnitus, trouble swallowing and voice change  Eyes: Negative  Negative for photophobia, pain and visual disturbance  Respiratory: Negative  Negative for shortness of breath  Cardiovascular: Negative  Negative for palpitations  Gastrointestinal: Negative  Negative for nausea and vomiting  Endocrine: Negative  Negative for cold intolerance  Genitourinary: Negative  Negative for dysuria, frequency and urgency  Musculoskeletal: Negative  Negative for gait problem, myalgias and neck pain  Skin: Negative  Negative for rash  Allergic/Immunologic: Negative  Neurological: Positive for headaches  Negative for dizziness, tremors, seizures, syncope, facial asymmetry, speech difficulty, weakness, light-headedness and numbness  Hematological: Negative  Does not bruise/bleed easily  Psychiatric/Behavioral: Negative  Negative for confusion, hallucinations and sleep disturbance  All other systems reviewed and are negative        I have spent 25 minutes with Patient  today in which greater than 50% of this time was spent in counseling/coordination of care regarding Prognosis, Risks and benefits of tx options, Intructions for management, Patient education, Importance of tx compliance, Risk factor reductions and Impressions  I also spent 8 minutes non face to face for this patient the same day         Author:  Yoko Cuevas MD 1/13/2023 1:49 PM

## 2023-01-13 NOTE — PATIENT INSTRUCTIONS
We discussed considering sleep study if migraines remain increased     Headache/migraine treatment:   Abortive medications (for immediate treatment of a headache): It is ok to take ibuprofen, acetaminophen or naproxen (Advil, Tylenol,  Aleve, Excedrin) if they help your headaches you should limit these to No more than 3 times a week to avoid medication overuse/rebound headaches  For your more moderate to severe migraines take this medication early   Maxalt (rizatriptan) 10mg tabs - take one at the onset of headache  May repeat one time after 1-2 hours if pain has not resolved  (Max 2 a day and 9 a month)     For back up:   -     divalproex sodium (DEPAKOTE) 250 mg EC tablet; For unrelenting migraine take 500 mg nightly for 1-5 nights, if not tolerated can try 250 mg instead      Over the counter preventive supplements for headaches/migraines (if you try, try for 3 months straight)  (to take every day to help prevent headaches - not to take at the time of headache):  [x] Magnesium 400mg daily (If any diarrhea or upset stomach, decrease dose  as tolerated)    Prescription preventive medications for headaches/migraines   (to take every day to help prevent headaches - not to take at the time of headache):  [x] Emgality/Galcanezumab - 120 mg injections every 30 days     READ INSTRUCTIONS that come with the medication  REFRIGERATE  Keep out of direct sunlight  Prior to administration, allow to come to room temperature for 30 minutes  Do not warm using a heat source (eg, microwave or hot water)  Do not shake  Administer in preferably abdomen (avoiding 2 inches around the navel), thigh, upper arm, or buttocks avoiding areas of skin that are tender, bruised, red or hard  Deliver entire contents of single-use prefilled pen or syringe        *Typically these types of medications take time untill you see the benefit, although some may see improvement in days, often it may take weeks, especially if the medication is being titrated up to a beneficial level  Please contact us if there are any concerns or questions regarding the medication  Lifestyle Recommendations:  [x] SLEEP - Maintain a regular sleep schedule: Adults need at least 7-8 hours of uninterrupted a night  Maintain good sleep hygiene:  Going to bed and waking up at consistent times, avoiding excessive daytime naps, avoiding caffeinated beverages in the evening, avoid excessive stimulation in the evening and generally using bed primarily for sleeping  One hour before bedtime would recommend turning lights down lower, decreasing your activity (may read quietly, listen to music at a low volume)  When you get into bed, should eliminate all technology (no texting, emailing, playing with your phone, iPad or tablet in bed)  [x] HYDRATION - Maintain good hydration  Drink  2L of fluid a day (4 typical small water bottles)  [x] DIET - Maintain good nutrition  In particular don't skip meals and try and eat healthy balanced meals regularly  [x] TRIGGERS - Look for other triggers and avoid them: Limit caffeine to 1-2 cups a day or less  Avoid dietary triggers that you have noticed bring on your headaches (this could include aged cheese, peanuts, MSG, aspartame and nitrates)  [x] EXERCISE - physical exercise as we all know is good for you in many ways, and not only is good for your heart, but also is beneficial for your mental health, cognitive health and  chronic pain/headaches  I would encourage at the least 5 days of physical exercise weekly for at least 30 minutes  Education and Follow-up  [x] Please call with any questions or concerns  Of course if any new concerning symptoms go to the emergency department    [x] Follow up 3 months, sooner if needed

## 2023-01-13 NOTE — PROGRESS NOTES
Review of Systems   Constitutional: Negative  Negative for appetite change and fever  HENT: Negative  Negative for hearing loss, tinnitus, trouble swallowing and voice change  Eyes: Negative  Negative for photophobia, pain and visual disturbance  Respiratory: Negative  Negative for shortness of breath  Cardiovascular: Negative  Negative for palpitations  Gastrointestinal: Negative  Negative for nausea and vomiting  Endocrine: Negative  Negative for cold intolerance  Genitourinary: Negative  Negative for dysuria, frequency and urgency  Musculoskeletal: Negative  Negative for gait problem, myalgias and neck pain  Skin: Negative  Negative for rash  Allergic/Immunologic: Negative  Neurological: Positive for headaches (migraines 3-4 times a weeks)  Negative for dizziness, tremors, seizures, syncope, facial asymmetry, speech difficulty, weakness, light-headedness and numbness  Hematological: Negative  Does not bruise/bleed easily  Psychiatric/Behavioral: Negative  Negative for confusion, hallucinations and sleep disturbance  All other systems reviewed and are negative

## 2023-01-23 ENCOUNTER — TELEPHONE (OUTPATIENT)
Dept: GASTROENTEROLOGY | Facility: CLINIC | Age: 48
End: 2023-01-23

## 2023-01-23 NOTE — TELEPHONE ENCOUNTER
Scheduled date of colonoscopy (as of today): 03/13/2023  Physician performing colonoscopy:Dr Kamryn Shields  Location of colonoscopy: [facility]  Clearances: N/A

## 2023-03-13 ENCOUNTER — ANESTHESIA (OUTPATIENT)
Dept: GASTROENTEROLOGY | Facility: AMBULARY SURGERY CENTER | Age: 48
End: 2023-03-13

## 2023-03-13 ENCOUNTER — ANESTHESIA EVENT (OUTPATIENT)
Dept: GASTROENTEROLOGY | Facility: AMBULARY SURGERY CENTER | Age: 48
End: 2023-03-13

## 2023-03-13 ENCOUNTER — HOSPITAL ENCOUNTER (OUTPATIENT)
Dept: GASTROENTEROLOGY | Facility: AMBULARY SURGERY CENTER | Age: 48
Setting detail: OUTPATIENT SURGERY
Discharge: HOME | End: 2023-03-13
Attending: INTERNAL MEDICINE

## 2023-03-13 VITALS
TEMPERATURE: 97.8 F | HEIGHT: 64 IN | DIASTOLIC BLOOD PRESSURE: 55 MMHG | WEIGHT: 130 LBS | OXYGEN SATURATION: 100 % | SYSTOLIC BLOOD PRESSURE: 109 MMHG | RESPIRATION RATE: 16 BRPM | HEART RATE: 74 BPM | BODY MASS INDEX: 22.2 KG/M2

## 2023-03-13 DIAGNOSIS — Z12.11 SCREENING FOR COLON CANCER: ICD-10-CM

## 2023-03-13 RX ORDER — SODIUM CHLORIDE, SODIUM LACTATE, POTASSIUM CHLORIDE, CALCIUM CHLORIDE 600; 310; 30; 20 MG/100ML; MG/100ML; MG/100ML; MG/100ML
125 INJECTION, SOLUTION INTRAVENOUS CONTINUOUS
Status: DISCONTINUED | OUTPATIENT
Start: 2023-03-13 | End: 2023-03-17 | Stop reason: HOSPADM

## 2023-03-13 RX ORDER — PROPOFOL 10 MG/ML
INJECTION, EMULSION INTRAVENOUS CONTINUOUS PRN
Status: DISCONTINUED | OUTPATIENT
Start: 2023-03-13 | End: 2023-03-13

## 2023-03-13 RX ORDER — PROPOFOL 10 MG/ML
INJECTION, EMULSION INTRAVENOUS AS NEEDED
Status: DISCONTINUED | OUTPATIENT
Start: 2023-03-13 | End: 2023-03-13

## 2023-03-13 RX ADMIN — PROPOFOL 100 MCG/KG/MIN: 10 INJECTION, EMULSION INTRAVENOUS at 08:07

## 2023-03-13 RX ADMIN — PROPOFOL 100 MG: 10 INJECTION, EMULSION INTRAVENOUS at 08:07

## 2023-03-13 RX ADMIN — SODIUM CHLORIDE, SODIUM LACTATE, POTASSIUM CHLORIDE, AND CALCIUM CHLORIDE: .6; .31; .03; .02 INJECTION, SOLUTION INTRAVENOUS at 08:05

## 2023-03-13 NOTE — ANESTHESIA PREPROCEDURE EVALUATION
Procedure:  COLONOSCOPY    Relevant Problems   NEURO/PSYCH   (+) Episodic tension-type headache, not intractable      Other   (+) Tobacco dependence   h/o ALL as a child, in remission > 40yrs     Physical Exam    Airway    Mallampati score: II  TM Distance: >3 FB  Neck ROM: full     Dental   Comment: Denies loose teeth,     Cardiovascular  Cardiovascular exam normal    Pulmonary  Pulmonary exam normal     Other Findings  Portions of exam deferred due to low yield and/or unknown COVID status      Anesthesia Plan  ASA Score- 2     Anesthesia Type- IV sedation with anesthesia with ASA Monitors  Additional Monitors:   Airway Plan:           Plan Factors-Exercise tolerance (METS): >4 METS  Chart reviewed  Existing labs reviewed  Patient summary reviewed  Patient is a current smoker  Induction- intravenous  Postoperative Plan-     Informed Consent- Anesthetic plan and risks discussed with patient  I personally reviewed this patient with the CRNA  Discussed and agreed on the Anesthesia Plan with the CRNA  James Pena

## 2023-03-13 NOTE — ANESTHESIA POSTPROCEDURE EVALUATION
Post-Op Assessment Note    CV Status:  Stable  Pain Score: 0    Pain management: adequate     Mental Status:  Alert and awake   Hydration Status:  Euvolemic   PONV Controlled:  Controlled   Airway Patency:  Patent      Post Op Vitals Reviewed: Yes      Staff: CRNA, Anesthesiologist         No notable events documented      BP   116/70   Temp   98   Pulse  69   Resp   17   SpO2   98

## 2023-03-13 NOTE — H&P
History and Physical - [facility]  Kristan Foster 52 y o  female MRN: [id number]                  HPI: Kristan Foster is a 52y o  year old female who presents for colon      REVIEW OF SYSTEMS: Per the HPI, and otherwise unremarkable      Historical Information   Past Medical History:   Diagnosis Date   • Acute lymphocytic leukemia ([facility] ) 1983   • ALL (acute lymphoid leukemia) in remission ([facility] ) 11/19/2020   • Pilar cyst    • Verruca      Past Surgical History:   Procedure Laterality Date   • CYST REMOVAL     • WISDOM TOOTH EXTRACTION       Social History   Social History     Substance and Sexual Activity   Alcohol Use Yes   • Alcohol/week: 5 0 standard drinks   • Types: 5 Standard drinks or equivalent per week    Comment: During [employer] or After     Social History     Substance and Sexual Activity   Drug Use Never     Social History     Tobacco Use   Smoking Status Every Day   • Packs/day: 0 25   • Years: 25 00   • Pack years: 6 25   • Types: Cigarettes   • Start date: 1/1/1994   Smokeless Tobacco Never     Family History   Problem Relation Age of Onset   • Arthritis Mother    • Hypertension Mother    • Prostate cancer Father    • No Known Problems Sister    • No Known Problems Brother    • Ovarian cancer Maternal Grandmother    • Heart disease Maternal Grandfather    • Hypertension Maternal Grandfather    • Hyperlipidemia Maternal Grandfather    • Melanoma Paternal Grandmother    • Dizziness Paternal Grandmother    • Diabetes Paternal Grandmother    • Heart disease Paternal Grandfather    • Hyperlipidemia Paternal Grandfather    • No Known Problems Sister    • Asthma Neg Hx    • COPD Neg Hx    • Colon cancer Neg Hx    • Breast cancer Neg Hx    • Depression Neg Hx    • Mental illness Neg Hx        Meds/Allergies       Current Outpatient Medications:   •  Ascorbic Acid (vitamin C) 100 MG tablet  •  cyanocobalamin (VITAMIN B-12) 100 mcg tablet  •  Galcanezumab-gnlm 120 MG/ML SOAJ  •  magnesium 30 MG tablet  •  oxybutynin (DITROPAN-XL) 10 MG 24 hr tablet  •  benzonatate (TESSALON PERLES) 100 mg capsule  •  clobetasol (TEMOVATE) 0 05 % cream  •  divalproex sodium (DEPAKOTE) 250 mg EC tablet  •  rizatriptan (MAXALT) 10 mg tablet    Current Facility-Administered Medications:   •  lactated ringers infusion, 125 mL/hr, Intravenous, Continuous    No Known Allergies    Objective     /58   Pulse 83   Temp 97 7 °F (36 5 °C) (Temporal)   Resp 16   Ht 5' 4" (1 626 m)   Wt 59 kg (130 lb)   LMP 03/09/2023 (Exact Date)   SpO2 100%   BMI 22 31 kg/m²       PHYSICAL EXAM    Gen: NAD  Head: NCAT  CV: RRR  CHEST: Clear  ABD: soft, NT/ND  EXT: no edema      ASSESSMENT/PLAN:  This is a 52y o  year old female here for colonosocopy, and she is stable and optimized for her procedure

## 2023-04-25 ENCOUNTER — TELEPHONE (OUTPATIENT)
Dept: NEUROLOGY | Facility: CLINIC | Age: 48
End: 2023-04-25

## 2023-04-25 NOTE — TELEPHONE ENCOUNTER
Called patient and left voicemail to confirm their upcoming appointment with Dr Yoel Rubio  Informed patient about arriving in the [place] location 15 minutes prior to appointment to get checked in and go over chart

## 2023-05-05 ENCOUNTER — OFFICE VISIT (OUTPATIENT)
Dept: NEUROLOGY | Facility: CLINIC | Age: 48
End: 2023-05-05

## 2023-05-05 VITALS
SYSTOLIC BLOOD PRESSURE: 110 MMHG | HEIGHT: 64 IN | DIASTOLIC BLOOD PRESSURE: 71 MMHG | HEART RATE: 76 BPM | TEMPERATURE: 97.9 F | WEIGHT: 134 LBS | BODY MASS INDEX: 22.88 KG/M2

## 2023-05-05 DIAGNOSIS — G43.009 MIGRAINE WITHOUT AURA AND WITHOUT STATUS MIGRAINOSUS, NOT INTRACTABLE: Primary | ICD-10-CM

## 2023-05-05 NOTE — PATIENT INSTRUCTIONS
We could consider sleep study in the future if needed    Headache/migraine treatment:   Abortive medications (for immediate treatment of a headache): It is ok to take ibuprofen, acetaminophen or naproxen (Advil, Tylenol,  Aleve, Excedrin) if they help your headaches you should limit these to No more than 3 times a week to avoid medication overuse/rebound headaches  For your more moderate to severe migraines take this medication early   Maxalt (rizatriptan) 10mg tabs - take one at the onset of headache  May repeat one time after 1-2 hours if pain has not resolved  (Max 2 a day and 9 a month)     For back up:   -     divalproex sodium (DEPAKOTE) 250 mg EC tablet; For unrelenting migraine take 500 mg nightly for 1-5 nights, if not tolerated can try 250 mg instead      Over the counter preventive supplements for headaches/migraines (if you try, try for 3 months straight)  (to take every day to help prevent headaches - not to take at the time of headache):  [x] Magnesium 400mg daily (If any diarrhea or upset stomach, decrease dose  as tolerated)    Prescription preventive medications for headaches/migraines   (to take every day to help prevent headaches - not to take at the time of headache):  [x] Emgality/Galcanezumab - 120 mg injections every 28 days     READ INSTRUCTIONS that come with the medication  REFRIGERATE  Keep out of direct sunlight  Prior to administration, allow to come to room temperature for 30 minutes  Do not warm using a heat source (eg, microwave or hot water)  Do not shake  Administer in preferably abdomen (avoiding 2 inches around the navel), thigh, upper arm, or buttocks avoiding areas of skin that are tender, bruised, red or hard  Deliver entire contents of single-use prefilled pen or syringe        *Typically these types of medications take time untill you see the benefit, although some may see improvement in days, often it may take weeks, especially if the medication is being titrated up to a beneficial level  Please contact us if there are any concerns or questions regarding the medication  Lifestyle Recommendations:  [x] SLEEP - Maintain a regular sleep schedule: Adults need at least 7-8 hours of uninterrupted a night  Maintain good sleep hygiene:  Going to bed and waking up at consistent times, avoiding excessive daytime naps, avoiding caffeinated beverages in the evening, avoid excessive stimulation in the evening and generally using bed primarily for sleeping  One hour before bedtime would recommend turning lights down lower, decreasing your activity (may read quietly, listen to music at a low volume)  When you get into bed, should eliminate all technology (no texting, emailing, playing with your phone, iPad or tablet in bed)  [x] HYDRATION - Maintain good hydration  Drink  2L of fluid a day (4 typical small water bottles)  [x] DIET - Maintain good nutrition  In particular don't skip meals and try and eat healthy balanced meals regularly  [x] TRIGGERS - Look for other triggers and avoid them: Limit caffeine to 1-2 cups a day or less  Avoid dietary triggers that you have noticed bring on your headaches (this could include aged cheese, peanuts, MSG, aspartame and nitrates)  [x] EXERCISE - physical exercise as we all know is good for you in many ways, and not only is good for your heart, but also is beneficial for your mental health, cognitive health and  chronic pain/headaches  I would encourage at the least 5 days of physical exercise weekly for at least 30 minutes  Education and Follow-up  [x] Please call with any questions or concerns  Of course if any new concerning symptoms go to the emergency department    [x] Follow up 6 months, sooner if needed

## 2023-05-05 NOTE — PROGRESS NOTES
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 brain cyst   - as of 07/01/2022 MRI brain images uploaded from 11/19/2008 and 4/14/08  Review of Systems:   ROS obtained by medical assistant Personally reviewed and updated if indicated  I recommended PCP follow up for non neurologic problems  Review of Systems   Constitutional: Negative for appetite change and fever  HENT: Negative  Negative for hearing loss, tinnitus, trouble swallowing and voice change  Eyes: Negative  Negative for photophobia and pain  Respiratory: Negative  Negative for shortness of breath  Cardiovascular: Negative  Negative for palpitations  Gastrointestinal: Negative  Negative for nausea and vomiting  Endocrine: Negative  Negative for cold intolerance  Genitourinary: Negative  Negative for dysuria, frequency and urgency  Musculoskeletal: Negative  Negative for myalgias and neck pain  Skin: Negative  Negative for rash  Neurological: Negative  Negative for dizziness, tremors, seizures, syncope, facial asymmetry, speech difficulty, weakness, light-headedness, numbness and headaches     Hematological: Negative  Does not bruise/bleed easily  Psychiatric/Behavioral: Negative  Negative for confusion, hallucinations and sleep disturbance  All other systems reviewed and are negative  I have spent 25 minutes with Patient  today in which greater than 50% of this time was spent in counseling/coordination of care  I also spent 7 minutes non face to face for this patient the same day         Author:  Aleida Sotomayor MD 5/5/2023 10:29 AM

## 2023-06-12 DIAGNOSIS — G43.009 MIGRAINE WITHOUT AURA AND WITHOUT STATUS MIGRAINOSUS, NOT INTRACTABLE: ICD-10-CM

## 2023-06-12 NOTE — TELEPHONE ENCOUNTER
Previous script was sent to local pharm,  Pt now requesting it [facility]  Script entered    Please sign off

## 2023-07-24 ENCOUNTER — HOSPITAL ENCOUNTER (OUTPATIENT)
Dept: MAMMOGRAPHY | Facility: HOSPITAL | Age: 48
Discharge: HOME | End: 2023-07-24
Payer: COMMERCIAL

## 2023-07-24 VITALS — BODY MASS INDEX: 22.2 KG/M2 | HEIGHT: 64 IN | WEIGHT: 130 LBS

## 2023-07-24 DIAGNOSIS — Z12.31 ENCOUNTER FOR SCREENING MAMMOGRAM FOR BREAST CANCER: ICD-10-CM

## 2023-07-24 PROCEDURE — 77067 SCR MAMMO BI INCL CAD: CPT

## 2023-07-24 PROCEDURE — 77063 BREAST TOMOSYNTHESIS BI: CPT

## 2023-10-19 ENCOUNTER — OFFICE VISIT (OUTPATIENT)
Dept: URGENT CARE | Facility: MEDICAL CENTER | Age: 48
End: 2023-10-19
Payer: COMMERCIAL

## 2023-10-19 VITALS
WEIGHT: 132 LBS | BODY MASS INDEX: 22.53 KG/M2 | DIASTOLIC BLOOD PRESSURE: 59 MMHG | TEMPERATURE: 98 F | HEART RATE: 78 BPM | HEIGHT: 64 IN | OXYGEN SATURATION: 98 % | SYSTOLIC BLOOD PRESSURE: 117 MMHG | RESPIRATION RATE: 18 BRPM

## 2023-10-19 DIAGNOSIS — J45.21 MILD INTERMITTENT ASTHMATIC BRONCHITIS WITH ACUTE EXACERBATION: Primary | ICD-10-CM

## 2023-10-19 PROCEDURE — 99213 OFFICE O/P EST LOW 20 MIN: CPT | Performed by: PHYSICIAN ASSISTANT

## 2023-10-19 RX ORDER — ALBUTEROL SULFATE 90 UG/1
2 AEROSOL, METERED RESPIRATORY (INHALATION) EVERY 6 HOURS PRN
Qty: 18 G | Refills: 0 | Status: SHIPPED | OUTPATIENT
Start: 2023-10-19 | End: 2023-10-26

## 2023-10-19 RX ORDER — PREDNISONE 20 MG/1
20 TABLET ORAL 2 TIMES DAILY WITH MEALS
Qty: 10 TABLET | Refills: 0 | Status: SHIPPED | OUTPATIENT
Start: 2023-10-19 | End: 2023-10-24

## 2023-10-19 NOTE — PROGRESS NOTES
[facility]        NAME: Stanley Carson is a 50 y.o. female  : 1975    MRN: [id number]  DATE: 2023  TIME: 12:01 PM    Assessment and Plan   Mild intermittent asthmatic bronchitis with acute exacerbation [J45.21]  1. Mild intermittent asthmatic bronchitis with acute exacerbation  predniSONE 20 mg tablet    albuterol (Ventolin HFA) 90 mcg/act inhaler            Patient Instructions     Increase fluids  Take Prednisone 20mg twice daily x 5 days  Use Ventolin 2 puffs every 4-6 hours as needed until cough free  4. Follow up with PCP in 3-5 days if symptoms persist    Chief Complaint     Chief Complaint   Patient presents with    Cough     Started Monday, productive cough with coughing fits, runny nose          History of Present Illness       Rick Reich is a 51-year-old female presents with a 2-day history of runny nose, sneezing and persistent cough. Patient denies any fever, chills, body aches vomiting or diarrhea since the onset of illness. She is an active smoker and does have a history of seasonal allergies. Patient denies any prior history of asthma. Cough  This is a new problem. The current episode started in the past 7 days. The problem has been gradually worsening. The problem occurs every few minutes. The cough is Productive of purulent sputum. Associated symptoms include ear pain, headaches, nasal congestion, postnasal drip, a sore throat and wheezing. Pertinent negatives include no chest pain, chills, ear congestion, fever, heartburn, hemoptysis, myalgias, rash, rhinorrhea, shortness of breath, sweats or weight loss. The symptoms are aggravated by lying down. Risk factors for lung disease include smoking/tobacco exposure. Review of Systems   Review of Systems   Constitutional:  Negative for chills, fever and weight loss. HENT:  Positive for ear pain, postnasal drip and sore throat. Negative for rhinorrhea. Respiratory:  Positive for cough and wheezing.  Negative for hemoptysis and shortness of breath. Cardiovascular:  Negative for chest pain. Gastrointestinal:  Negative for heartburn. Musculoskeletal:  Negative for myalgias. Skin:  Negative for rash. Neurological:  Positive for headaches. Current Medications       Current Outpatient Medications:     albuterol (Ventolin HFA) 90 mcg/act inhaler, Inhale 2 puffs every 6 (six) hours as needed for wheezing for up to 7 days, Disp: 18 g, Rfl: 0    Ascorbic Acid (vitamin C) 100 MG tablet, Take 100 mg by mouth daily, Disp: , Rfl:     cyanocobalamin (VITAMIN B-12) 100 mcg tablet, Take by mouth daily, Disp: , Rfl:     Galcanezumab-gnlm 120 MG/ML SOAJ, Inject 120 mg under the skin every 28 days, Disp: 3 mL, Rfl: 3    magnesium 30 MG tablet, Take 500 mg by mouth daily, Disp: , Rfl:     oxybutynin (DITROPAN-XL) 10 MG 24 hr tablet, Take 10 mg by mouth daily, Disp: , Rfl:     predniSONE 20 mg tablet, Take 1 tablet (20 mg total) by mouth 2 (two) times a day with meals for 5 days, Disp: 10 tablet, Rfl: 0    rizatriptan (MAXALT) 10 mg tablet, Take 1 tablet (10 mg total) by mouth once as needed for migraine May repeat in 2 hours if needed.  Max 2/24 hours, 9/month., Disp: 9 tablet, Rfl: 12    Current Allergies     Allergies as of 10/19/2023    (No Known Allergies)            The following portions of the patient's history were reviewed and updated as appropriate: allergies, current medications, past family history, past medical history, past social history, past surgical history and problem list.     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    Pilar cyst     Verruca        Past Surgical History:   Procedure Laterality Date    CYST REMOVAL      WISDOM TOOTH EXTRACTION         Family History   Problem Relation Age of Onset    Arthritis Mother     Hypertension Mother     Prostate cancer Father     No Known Problems Sister     No Known Problems Brother     Ovarian cancer Maternal Grandmother     Heart disease Maternal Grandfather     Hypertension Maternal Grandfather     Hyperlipidemia Maternal Grandfather     Melanoma Paternal Grandmother     Dizziness Paternal Grandmother     Diabetes Paternal Grandmother     Heart disease Paternal Grandfather     Hyperlipidemia Paternal Grandfather     No Known Problems Sister     Asthma Neg Hx     COPD Neg Hx     Colon cancer Neg Hx     Breast cancer Neg Hx     Depression Neg Hx     Mental illness Neg Hx          Medications have been verified. Objective   /59   Pulse 78   Temp 98 °F (36.7 °C) (Temporal)   Resp 18   Ht 5' 4" (1.626 m)   Wt 59.9 kg (132 lb)   LMP 10/18/2023 (Exact Date)   SpO2 98%   BMI 22.66 kg/m²   Patient's last menstrual period was 10/18/2023 (exact date). Physical Exam     Physical Exam  Constitutional:       General: She is not in acute distress. Appearance: Normal appearance. She is not ill-appearing. HENT:      Head: Normocephalic and atraumatic. Right Ear: Tympanic membrane and ear canal normal.      Left Ear: Tympanic membrane and ear canal normal.      Nose: Congestion and rhinorrhea present. Rhinorrhea is clear. Mouth/Throat:      Lips: Pink. Pharynx: Oropharynx is clear. Cardiovascular:      Rate and Rhythm: Normal rate and regular rhythm. Heart sounds: Normal heart sounds, S1 normal and S2 normal. No murmur heard. Pulmonary:      Effort: Pulmonary effort is normal.      Breath sounds: Normal air entry. Examination of the right-middle field reveals wheezing. Examination of the left-middle field reveals wheezing. Examination of the right-lower field reveals wheezing. Examination of the left-lower field reveals wheezing. Wheezing present. Neurological:      Mental Status: She is alert.

## 2023-10-19 NOTE — PATIENT INSTRUCTIONS
Increase fluids  Take Prednisone 20mg twice daily x 5 days  Use Ventolin 2 puffs every 4-6 hours as needed until cough free  4.    Follow up with PCP in 3-5 days if symptoms persist

## 2023-11-06 ENCOUNTER — OFFICE VISIT (OUTPATIENT)
Dept: NEUROLOGY | Facility: CLINIC | Age: 48
End: 2023-11-06
Payer: COMMERCIAL

## 2023-11-06 VITALS
HEART RATE: 70 BPM | BODY MASS INDEX: 22.53 KG/M2 | SYSTOLIC BLOOD PRESSURE: 121 MMHG | WEIGHT: 132 LBS | DIASTOLIC BLOOD PRESSURE: 70 MMHG | TEMPERATURE: 97.8 F | HEIGHT: 64 IN

## 2023-11-06 DIAGNOSIS — G47.33 OBSTRUCTIVE SLEEP APNEA (ADULT) (PEDIATRIC): ICD-10-CM

## 2023-11-06 DIAGNOSIS — G43.009 MIGRAINE WITHOUT AURA AND WITHOUT STATUS MIGRAINOSUS, NOT INTRACTABLE: Primary | ICD-10-CM

## 2023-11-06 PROCEDURE — 99215 OFFICE O/P EST HI 40 MIN: CPT | Performed by: PSYCHIATRY & NEUROLOGY

## 2023-11-06 RX ORDER — DEXAMETHASONE 4 MG/1
TABLET ORAL
Qty: 9 TABLET | Refills: 0 | Status: SHIPPED | OUTPATIENT
Start: 2023-11-06

## 2023-11-06 RX ORDER — RIZATRIPTAN BENZOATE 10 MG/1
10 TABLET ORAL ONCE AS NEEDED
Qty: 9 TABLET | Refills: 12 | Status: SHIPPED | OUTPATIENT
Start: 2023-11-06

## 2023-11-06 RX ORDER — AMITRIPTYLINE HYDROCHLORIDE 10 MG/1
10 TABLET, FILM COATED ORAL
COMMUNITY

## 2023-11-06 NOTE — PROGRESS NOTES
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 facility-administered medications for this visit.         Allergies:    No Known Allergies    Family History:     Family History   Problem Relation Age of Onset    Arthritis Mother     Hypertension Mother     Prostate cancer Father     No Known Problems Sister     No Known Problems Sister     No Known Problems Brother     Ovarian cancer Maternal Grandmother     Heart disease Maternal Grandfather     Hypertension Maternal Grandfather     Hyperlipidemia Maternal Grandfather     Depression Maternal Grandfather     Melanoma Paternal Grandmother     Dizziness Paternal Grandmother     Diabetes Paternal Grandmother     Heart disease Paternal Grandfather     Hyperlipidemia Paternal Grandfather     Asthma Neg Hx     COPD Neg Hx     Colon cancer Neg Hx     Breast cancer Neg Hx     Mental illness Neg Hx        Social History:     Social History     Socioeconomic History    Marital status: /Civil Union     Spouse name: Emir    Number of children: 0    Years of education: Not on file    Highest education level: Not on file   Occupational History    Not on file   Tobacco Use Smoking status: Every Day     Packs/day: 0.25     Years: 25.00     Total pack years: 6.25     Types: Cigarettes     Start date: 1/1/1994    Smokeless tobacco: Never   Vaping Use    Vaping Use: Never used   Substance and Sexual Activity    Alcohol use: Not Currently     Alcohol/week: 5.0 standard drinks of alcohol     Types: 5 Standard drinks or equivalent per week     Comment: During Dinner or After    Drug use: Never    Sexual activity: Yes     Partners: Male     Birth control/protection: None   Other Topics Concern    Not on file   Social History Narrative    Menses irregular. No menopausal symptoms    No pap, mammo      Social Determinants of Health     Financial Resource Strain: Low Risk  (11/19/2020)    Overall Financial Resource Strain (CARDIA)     Difficulty of Paying Living Expenses: Not hard at all   Food Insecurity: No Food Insecurity (11/19/2020)    Hunger Vital Sign     Worried About Running Out of Food in the Last Year: Never true     Ran Out of Food in the Last Year: Never true   Transportation Needs: No Transportation Needs (11/19/2020)    PRAPARE - Transportation     Lack of Transportation (Medical): No     Lack of Transportation (Non-Medical):  No   Physical Activity: Insufficiently Active (11/19/2020)    Exercise Vital Sign     Days of Exercise per Week: 3 days     Minutes of Exercise per Session: 20 min   Stress: No Stress Concern Present (11/19/2020)    [address] [facility]     Feeling of Stress : Not at all   Social Connections: Unknown (11/19/2020)    Social Connection and Isolation Panel [NHANES]     Frequency of Communication with Friends and Family: Not on file     Frequency of Social Gatherings with Friends and Family: Not on file     Attends Evangelical Services: Not on file     Active Member of Clubs or Organizations: Not on file     Attends Club or Organization Meetings: Not on file     Marital Status:    Intimate Partner Violence: Not on file   Housing Stability: Not on file         Objective:     Physical Exam:                                                                 Vitals:            Constitutional:    /70 (BP Location: Right arm, Patient Position: Sitting, Cuff Size: Standard)   Pulse 70   Temp 97.8 °F (36.6 °C) (Tympanic)   Ht 5' 4" (1.626 m)   Wt 59.9 kg (132 lb)   LMP 10/18/2023 (Exact Date)   BMI 22.66 kg/m²   BP Readings from Last 3 Encounters:   11/06/23 121/70   10/19/23 117/59   05/05/23 110/71     Pulse Readings from Last 3 Encounters:   11/06/23 70   10/19/23 78   05/05/23 76         Well developed, well nourished, well groomed. Psychiatric:  Normal behavior and appropriate affect        Neurological Examination:     Mental status/cognitive function:   Attention span and concentration as well as fund of knowledge are appropriate for age. Normal language and spontaneous speech. Cranial Nerves:   VII-facial expression symmetric  Motor Exam: symmetric bulk throughout. no atrophy, fasciculations or abnormal movements noted. Coordination:  no apparent dysmetria, ataxia or tremor noted  Gait: steady casual gait           Pertinent lab results:   See EMR for recent labs  12/22/2020 CMP with chloride 109, magnesium 2.7   B12 1354  CBC unremarkable  Normal TSH with elevated thyroglobulin antibody and thyroid microsomal antibody        Imaging:   -  CTA head with and without contrast 8/13/21: CT brain: White matter changes suggestive of chronic microangiopathy. CT angiography: Normal intracranial vasculature. - MRI brain  With without contrast 05/06/2021: No acute intracranial abnormality or pathologic intracranial enhancement Nonspecific cerebral white matter signal abnormality without edema or enhancement. These changes may represent a combination of migraine induced changes, sequela of small vessel ischemic disease, and posttreatment changes.   Other etiologies such as demyelinating disease, vasculitis and/or collagen vascular disease are less likely considerations but difficult to entirely exclude on the basis of this single examination. Comparison with prior studies would be helpful, particularly to ensure stability of these white matter findings  *Discussed MRI Brain with our Demyelination specialist and she did not feel it was consistent with MS (also no history of MS symptoms), she recommended CTA of the head to further evaluate for vascular etiology of the white matter changes (which was unremarkable)  We also discussed certainly could be post treatment changes related to past brain radiation with cobalt. Worse than 2008  *As of my retrospective review 11/6/2023 she has a partially empty sella, question of mild optic nerve sheath prominence bilaterally with slight tortuosity in my opinion, cerebellar tonsils overlay the foramen magnum with tight junction without Chiari    Per patient - MRI brain at [facility] for a cyst in her scalp. No brain cyst   - as of 07/01/2022 MRI brain images uploaded from 11/19/2008 and 4/14/08    Review of Systems:   ROS obtained by medical assistant and personally reviewed, but if any symptoms listed below say negative, does not mean patient has not had this symptom since last visit, please see HPI for details of symptoms discussed this visit. I recommended PCP follow up for non neurologic problems. Review of Systems   Constitutional:  Negative for appetite change and fever. HENT: Negative. Negative for hearing loss, tinnitus, trouble swallowing and voice change. Eyes: Negative. Negative for photophobia and pain. Respiratory: Negative. Negative for shortness of breath. Cardiovascular: Negative. Negative for palpitations. Gastrointestinal: Negative. Negative for nausea and vomiting. Endocrine: Negative. Negative for cold intolerance. Genitourinary: Negative. Negative for dysuria, frequency and urgency. Musculoskeletal: Negative. Negative for myalgias and neck pain. Skin: Negative. Negative for rash. Neurological:  Positive for headaches. Negative for dizziness, tremors, seizures, syncope, facial asymmetry, speech difficulty, weakness, light-headedness and numbness. Hematological: Negative. Does not bruise/bleed easily. Psychiatric/Behavioral: Negative. Negative for confusion, hallucinations and sleep disturbance    I have spent 35  minutes with Patient  today in which greater than 50% of this time was spent in counseling/coordination of care regarding Diagnostic results, Prognosis, Risks and benefits of tx options, Instructions for management, Patient and family education, Importance of tx compliance, Risk factor reductions, Impressions, Counseling / Coordination of care, Documenting in the medical record, Reviewing / ordering tests, medicine, procedures  , and Obtaining or reviewing history  . I also spent 8 minutes non face to face for this patient the same day.        Author:  Fabiana Macedo MD 11/6/2023 7:30 PM

## 2023-11-29 ENCOUNTER — TELEPHONE (OUTPATIENT)
Dept: NEUROLOGY | Facility: CLINIC | Age: 48
End: 2023-11-29

## 2023-11-29 DIAGNOSIS — G43.009 MIGRAINE WITHOUT AURA AND WITHOUT STATUS MIGRAINOSUS, NOT INTRACTABLE: ICD-10-CM

## 2023-11-29 NOTE — TELEPHONE ENCOUNTER
[facility] sent to [facility] June for 3 month supply with 3 refills  There should be refills available    Mychart message sent to pt

## 2023-11-30 NOTE — TELEPHONE ENCOUNTER
11/29/23 at 11:37    Holly, this is Lorena Wyatt calling in regards to my medication for emgality.      [phone]     Will call after [phone]

## 2023-11-30 NOTE — TELEPHONE ENCOUNTER
[facility], spoke to Jesse re: below. Confirmed that they have active script on file. In order to ship the meds, pt must call [phone] to schedule delivery.      Pt made aware and verbalized understanding

## 2023-12-08 ENCOUNTER — OFFICE VISIT (OUTPATIENT)
Dept: FAMILY MEDICINE CLINIC | Facility: CLINIC | Age: 48
End: 2023-12-08

## 2023-12-08 VITALS
BODY MASS INDEX: 22.02 KG/M2 | WEIGHT: 129 LBS | SYSTOLIC BLOOD PRESSURE: 120 MMHG | HEIGHT: 64 IN | DIASTOLIC BLOOD PRESSURE: 78 MMHG | TEMPERATURE: 98 F

## 2023-12-08 DIAGNOSIS — H93.13 TINNITUS OF BOTH EARS: ICD-10-CM

## 2023-12-08 DIAGNOSIS — J06.9 UPPER RESPIRATORY TRACT INFECTION, UNSPECIFIED TYPE: ICD-10-CM

## 2023-12-08 DIAGNOSIS — E01.0 THYROMEGALY: ICD-10-CM

## 2023-12-08 DIAGNOSIS — Z12.31 BREAST CANCER SCREENING BY MAMMOGRAM: ICD-10-CM

## 2023-12-08 DIAGNOSIS — H91.93 BILATERAL HEARING LOSS, UNSPECIFIED HEARING LOSS TYPE: ICD-10-CM

## 2023-12-08 DIAGNOSIS — Z00.00 ANNUAL PHYSICAL EXAM: Primary | ICD-10-CM

## 2023-12-08 DIAGNOSIS — Z11.52 ENCOUNTER FOR SCREENING FOR COVID-19: ICD-10-CM

## 2023-12-08 DIAGNOSIS — N32.81 OVERACTIVE BLADDER: ICD-10-CM

## 2023-12-08 LAB
SARS-COV-2 AG UPPER RESP QL IA: NEGATIVE
VALID CONTROL: NORMAL

## 2023-12-08 RX ORDER — OXYBUTYNIN CHLORIDE 10 MG/1
10 TABLET, EXTENDED RELEASE ORAL DAILY
Qty: 90 TABLET | Refills: 3 | Status: SHIPPED | OUTPATIENT
Start: 2023-12-08

## 2023-12-08 RX ORDER — BENZONATATE 100 MG/1
100 CAPSULE ORAL 3 TIMES DAILY PRN
Qty: 20 CAPSULE | Refills: 0 | Status: SHIPPED | OUTPATIENT
Start: 2023-12-08

## 2023-12-08 RX ORDER — AZITHROMYCIN 250 MG/1
TABLET, FILM COATED ORAL
Qty: 6 TABLET | Refills: 0 | Status: SHIPPED | OUTPATIENT
Start: 2023-12-08 | End: 2023-12-13

## 2023-12-08 NOTE — PROGRESS NOTES
[id number]    NAME: Simon Vega  AGE: 50 y.o. SEX: female  : 1975   DATE: 2023     Assessment and Plan:     Problem List Items Addressed This Visit        Genitourinary    Overactive bladder     Chronic, controlled  Continue oxybutynin 10mg XR          Relevant Medications    oxybutynin (DITROPAN-XL) 10 MG 24 hr tablet   Other Visit Diagnoses     Annual physical exam    -  Primary    Relevant Orders    Lipid Panel with Direct LDL reflex    CBC    Comprehensive metabolic panel    Thyromegaly        Relevant Orders    TSH, 3rd generation with Free T4 reflex    Thyroid Antibodies Panel    Breast cancer screening by mammogram        Relevant Orders    Mammo screening bilateral w 3d & cad    Encounter for screening for COVID-19        Relevant Orders    POCT Rapid Covid Ag (Completed)    Bilateral hearing loss, unspecified hearing loss type        Relevant Orders    Ambulatory Referral to Audiology    Ambulatory Referral to Otolaryngology    Tinnitus of both ears        Relevant Orders    Ambulatory Referral to Audiology    Ambulatory Referral to Otolaryngology    Upper respiratory tract infection, unspecified type        Relevant Medications    azithromycin (Zithromax) 250 mg tablet    benzonatate (TESSALON PERLES) 100 mg capsule        Immunizations and preventive care screenings were discussed with patient today. Appropriate education was printed on patient's after visit summary. Counseling:  Alcohol/drug use: discussed moderation in alcohol intake, the recommendations for healthy alcohol use, and avoidance of illicit drug use. Dental Health: discussed importance of regular tooth brushing, flossing, and dental visits. Exercise: the importance of regular exercise/physical activity was discussed. Recommend exercise 3-5 times per week for at least 30 minutes.        Depression Screening and Follow-up Plan: Patient was screened for depression during today's encounter. They screened negative with a PHQ-2 score of 0. Return in about 1 year (around 12/8/2024) for Annual physical.     Chief Complaint:     Chief Complaint   Patient presents with   • Physical Exam   • Nasal Congestion   • Cough   • Headache      History of Present Illness:     Adult Annual Physical   Patient here for a comprehensive physical exam. The patient reports problems - sick since Tuesday with cough. No runny nose, fever, GI symptoms . Diet and Physical Activity  Diet/Nutrition: well balanced diet. Exercise: moderate cardiovascular exercise. Depression Screening  PHQ-2/9 Depression Screening    Little interest or pleasure in doing things: 0 - not at all  Feeling down, depressed, or hopeless: 0 - not at all  PHQ-2 Score: 0  PHQ-2 Interpretation: Negative depression screen       General Health  Sleep: sleeps well. Hearing: decreased - bilateral.  Vision: most recent eye exam <1 year ago and wears glasses. Dental: regular dental visits. /GYN Health  Follows with gynecology? yes   Patient is: premenopausal  Last menstrual period: Patient's last menstrual period was 12/04/2023 (exact date). Review of Systems:     Review of Systems   Constitutional:  Negative for activity change, chills and fever. HENT:  Negative for congestion, rhinorrhea and sore throat. Eyes:  Negative for visual disturbance. Respiratory:  Positive for cough. Negative for shortness of breath and wheezing. Cardiovascular:  Negative for chest pain and palpitations. Gastrointestinal:  Negative for abdominal pain, blood in stool, constipation, diarrhea, nausea and vomiting. Genitourinary:  Negative for dysuria. Musculoskeletal:  Negative for arthralgias and myalgias. Skin:  Negative for rash. Neurological:  Negative for dizziness, weakness and headaches. All other systems reviewed and are negative.      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25     Types: Cigarettes     Start date: 1/1/1994     Passive exposure: Current   • Smokeless tobacco: Never   Vaping Use   • Vaping Use: Never used   Substance and Sexual Activity   • Alcohol use: Not Currently     Alcohol/week: 5.0 standard drinks of alcohol     Types: 5 Standard drinks or equivalent per week     Comment: During Dinner or After   • Drug use: Never   • Sexual activity: Yes     Partners: Male     Birth control/protection: None   Other Topics Concern   • None   Social History Narrative    Menses irregular. No menopausal symptoms    No pap, mammo      Social Determinants of Health     Financial Resource Strain: Low Risk  (11/19/2020)    Overall Financial Resource Strain (CARDIA)    • Difficulty of Paying Living Expenses: Not hard at all   Food Insecurity: No Food Insecurity (11/19/2020)    Hunger Vital Sign    • Worried About Running Out of Food in the Last Year: Never true    • Ran Out of Food in the Last Year: Never true   Transportation Needs: No Transportation Needs (11/19/2020)    PRAPARE - Transportation    • Lack of Transportation (Medical): No    • Lack of Transportation (Non-Medical):  No   Physical Activity: Insufficiently Active (11/19/2020)    Exercise Vital Sign    • Days of Exercise per Week: 3 days    • Minutes of Exercise per Session: 20 min   Stress: No Stress Concern Present (11/19/2020)    [facility]    • Feeling of Stress : Not at all   Social Connections: Unknown (11/19/2020)    Social Connection and Isolation Panel [NHANES]    • Frequency of Communication with Friends and Family: Not on file    • Frequency of Social Gatherings with Friends and Family: Not on file    • Attends Orthodox Services: Not on file    • Active Member of Clubs or Organizations: Not on file    • Attends Club or Organization Meetings: Not on file    • Marital Status:    Intimate Partner Violence: Not on file   Housing Stability: Not on file      Family History:     Family History   Problem Relation Age of Onset   • Arthritis Mother    • Hypertension Mother    • Prostate cancer Father    • No Known Problems Sister    • No Known Problems Sister    • No Known Problems Brother    • Ovarian cancer Maternal Grandmother    • Heart disease Maternal Grandfather    • Hypertension Maternal Grandfather    • Hyperlipidemia Maternal Grandfather    • Depression Maternal Grandfather    • Melanoma Paternal Grandmother    • Dizziness Paternal Grandmother    • Diabetes Paternal Grandmother    • Heart disease Paternal Grandfather    • Hyperlipidemia Paternal Grandfather    • Asthma Neg Hx    • COPD Neg Hx    • Colon cancer Neg Hx    • Breast cancer Neg Hx    • Mental illness Neg Hx       Current Medications:     Current Outpatient Medications   Medication Sig Dispense Refill   • amitriptyline (ELAVIL) 10 mg tablet Take 10 mg by mouth daily at bedtime     • Ascorbic Acid (vitamin C) 100 MG tablet Take 100 mg by mouth daily     • azithromycin (Zithromax) 250 mg tablet Take 2 tablets (500 mg total) by mouth daily for 1 day, THEN 1 tablet (250 mg total) daily for 4 days.  6 tablet 0   • benzonatate (TESSALON PERLES) 100 mg capsule Take 1 capsule (100 mg total) by mouth 3 (three) times a day as needed for cough 20 capsule 0   • cyanocobalamin (VITAMIN B-12) 100 mcg tablet Take by mouth daily     • dexamethasone (DECADRON) 4 mg tablet 8 mg p.o. with breakfast for 1 to 2 days and then 4 mg for 1 to 5 days for unrelenting headache 9 tablet 0   • Galcanezumab-gnlm 120 MG/ML SOAJ Inject 120 mg under the skin every 28 days 3 mL 3   • magnesium 30 MG tablet Take 500 mg by mouth daily     • oxybutynin (DITROPAN-XL) 10 MG 24 hr tablet Take 1 tablet (10 mg total) by mouth daily 90 tablet 3   • rizatriptan (MAXALT) 10 mg tablet Take 1 tablet (10 mg total) by mouth once as needed for migraine May repeat in 2 hours if needed. Max 2/24 hours, 9/month. 9 tablet 12     No current facility-administered medications for this visit. Allergies:     No Known Allergies   Physical Exam:     /78   Temp 98 °F (36.7 °C)   Ht 5' 4" (1.626 m)   Wt 58.5 kg (129 lb)   LMP 12/04/2023 (Exact Date)   BMI 22.14 kg/m²     Physical Exam  Vitals and nursing note reviewed. Constitutional:       General: She is not in acute distress. Appearance: She is well-developed. She is not ill-appearing. HENT:      Head: Normocephalic and atraumatic. Right Ear: Tympanic membrane, ear canal and external ear normal. No middle ear effusion. Left Ear: Tympanic membrane, ear canal and external ear normal.  No middle ear effusion. Nose: Nose normal. No congestion or rhinorrhea. Mouth/Throat:      Lips: Pink. Mouth: Mucous membranes are moist.      Pharynx: Oropharynx is clear. Uvula midline. No oropharyngeal exudate. Tonsils: No tonsillar exudate. Eyes:      General: Lids are normal.      Extraocular Movements: Extraocular movements intact. Conjunctiva/sclera: Conjunctivae normal.      Pupils: Pupils are equal, round, and reactive to light. Neck:      Thyroid: No thyromegaly. Trachea: No tracheal deviation. Cardiovascular:      Rate and Rhythm: Normal rate and regular rhythm. Pulses: Normal pulses.       Heart sounds: Normal heart sounds, S1 normal and S2 normal. No murmur heard. Pulmonary:      Effort: Pulmonary effort is normal. No respiratory distress. Breath sounds: Normal breath sounds. No decreased breath sounds, wheezing, rhonchi or rales. Abdominal:      General: Bowel sounds are normal. There is no distension. Palpations: Abdomen is soft. Tenderness: There is no abdominal tenderness. Musculoskeletal:      Right lower leg: No edema. Left lower leg: No edema. Lymphadenopathy:      Cervical: No cervical adenopathy. Skin:     General: Skin is warm and dry. Capillary Refill: Capillary refill takes less than 2 seconds. Neurological:      Mental Status: She is alert and oriented to person, place, and time. Deep Tendon Reflexes: Reflexes normal.      Reflex Scores:       Patellar reflexes are 2+ on the right side and 2+ on the left side. Psychiatric:         Attention and Perception: Attention normal.         Mood and Affect: Mood normal.         Thought Content: Thought content does not include suicidal ideation.         Krystal Swenson MD  [address]

## 2023-12-08 NOTE — PATIENT INSTRUCTIONS
Wellness Visit for Adults   AMBULATORY CARE:   A wellness visit  is when you see your healthcare provider to get screened for health problems. Your healthcare provider will also give you advice on how to stay healthy. Write down your questions so you remember to ask them. Ask your healthcare provider how often you should have a wellness visit. What happens at a wellness visit:  Your healthcare provider will ask about your health, and your family history of health problems. This includes high blood pressure, heart disease, and cancer. He or she will ask if you have symptoms that concern you, if you smoke, and about your mood. You may also be asked about your intake of medicines, supplements, food, and alcohol. Any of the following may be done: Your weight  will be checked. Your height may also be checked so your body mass index (BMI) can be calculated. Your BMI shows if you are at a healthy weight. Your blood pressure  and heart rate will be checked. Your temperature may also be checked. Blood and urine tests  may be done. Blood tests may be done to check your cholesterol levels. Abnormal cholesterol levels increase your risk for heart disease and stroke. You may also need a blood or urine test to check for diabetes if you are at increased risk. Urine tests may be done to look for signs of an infection or kidney disease. A physical exam  includes checking your heartbeat and lungs with a stethoscope. Your healthcare provider may also check your skin to look for sun damage. Screening tests  may be recommended. A screening test is done to check for diseases that may not cause symptoms. The screening tests you may need depend on your age, gender, family history, and lifestyle habits. For example, colorectal screening may be recommended if you are 48years old or older. Screening tests you need if you are a woman:   A Pap smear  is used to screen for cervical cancer.  Pap smears are usually done every 3 to 5 years depending on your age. You may need them more often if you have had abnormal Pap smear test results in the past. Ask your healthcare provider how often you should have a Pap smear. A mammogram  is an x-ray of your breasts to screen for breast cancer. Experts recommend mammograms every 2 years starting at age 48 years. You may need a mammogram at age 52 years or younger if you have an increased risk for breast cancer. Talk to your healthcare provider about when you should start having mammograms and how often you need them. Vaccines you may need:   Get an influenza vaccine  every year. The influenza vaccine protects you from the flu. Several types of viruses cause the flu. The viruses change over time, so new vaccines are made each year. Get a tetanus-diphtheria (Td) booster vaccine  every 10 years. This vaccine protects you against tetanus and diphtheria. Tetanus is a severe infection that may cause painful muscle spasms and lockjaw. Diphtheria is a severe bacterial infection that causes a thick covering in the back of your mouth and throat. Get a human papillomavirus (HPV) vaccine  if you are female and aged 23 to 32 or male 23 to 24 and never received it. This vaccine protects you from HPV infection. HPV is the most common infection spread by sexual contact. HPV may also cause vaginal, penile, and anal cancers. Get a pneumococcal vaccine  if you are aged 72 years or older. The pneumococcal vaccine is an injection given to protect you from pneumococcal disease. Pneumococcal disease is an infection caused by pneumococcal bacteria. The infection may cause pneumonia, meningitis, or an ear infection. Get a shingles vaccine  if you are 60 or older, even if you have had shingles before. The shingles vaccine is an injection to protect you from the varicella-zoster virus. This is the same virus that causes chickenpox.  Shingles is a painful rash that develops in people who had chickenpox or have been exposed to the virus. How to eat healthy:  My Plate is a model for planning healthy meals. It shows the types and amounts of foods that should go on your plate. Fruits and vegetables make up about half of your plate, and grains and protein make up the other half. A serving of dairy is included on the side of your plate. The amount of calories and serving sizes you need depends on your age, gender, weight, and height. Examples of healthy foods are listed below:  Eat a variety of vegetables  such as dark green, red, and orange vegetables. You can also include canned vegetables low in sodium (salt) and frozen vegetables without added butter or sauces. Eat a variety of fresh fruits , canned fruit in 100% juice, frozen fruit, and dried fruit. Include whole grains. At least half of the grains you eat should be whole grains. Examples include whole-wheat bread, wheat pasta, brown rice, and whole-grain cereals such as oatmeal.    Eat a variety of protein foods such as seafood (fish and shellfish), lean meat, and poultry without skin (turkey and chicken). Examples of lean meats include pork leg, shoulder, or tenderloin, and beef round, sirloin, tenderloin, and extra lean ground beef. Other protein foods include eggs and egg substitutes, beans, peas, soy products, nuts, and seeds. Choose low-fat dairy products such as skim or 1% milk or low-fat yogurt, cheese, and cottage cheese. Limit unhealthy fats  such as butter, hard margarine, and shortening. Exercise:  Exercise at least 30 minutes per day on most days of the week. Some examples of exercise include walking, biking, dancing, and swimming. You can also fit in more physical activity by taking the stairs instead of the elevator or parking farther away from stores. Include muscle strengthening activities 2 days each week. Regular exercise provides many health benefits.  It helps you manage your weight, and decreases your risk for type 2 diabetes, heart disease, stroke, and high blood pressure. Exercise can also help improve your mood. Ask your healthcare provider about the best exercise plan for you. General health and safety guidelines:   Do not smoke. Nicotine and other chemicals in cigarettes and cigars can cause lung damage. Ask your healthcare provider for information if you currently smoke and need help to quit. E-cigarettes or smokeless tobacco still contain nicotine. Talk to your healthcare provider before you use these products. Limit alcohol. A drink of alcohol is 12 ounces of beer, 5 ounces of wine, or 1½ ounces of liquor. Lose weight, if needed. Being overweight increases your risk of certain health conditions. These include heart disease, high blood pressure, type 2 diabetes, and certain types of cancer. Protect your skin. Do not sunbathe or use tanning beds. Use sunscreen with a SPF 15 or higher. Apply sunscreen at least 15 minutes before you go outside. Reapply sunscreen every 2 hours. Wear protective clothing, hats, and sunglasses when you are outside. Drive safely. Always wear your seatbelt. Make sure everyone in your car wears a seatbelt. A seatbelt can save your life if you are in an accident. Do not use your cell phone when you are driving. This could distract you and cause an accident. Pull over if you need to make a call or send a text message. Practice safe sex. Use latex condoms if are sexually active and have more than one partner. Your healthcare provider may recommend screening tests for sexually transmitted infections (STIs). Wear helmets, lifejackets, and protective gear. Always wear a helmet when you ride a bike or motorcycle, go skiing, or play sports that could cause a head injury. Wear protective equipment when you play sports. Wear a lifejacket when you are on a boat or doing water sports.     © Copyright [employer] 2023 Information is for End User's use only and may not be sold, redistributed or otherwise used for commercial purposes. The above information is an  only. It is not intended as medical advice for individual conditions or treatments. Talk to your doctor, nurse or pharmacist before following any medical regimen to see if it is safe and effective for you.

## 2024-02-05 ENCOUNTER — TELEPHONE (OUTPATIENT)
Dept: NEUROLOGY | Facility: CLINIC | Age: 49
End: 2024-02-05

## 2024-02-05 NOTE — TELEPHONE ENCOUNTER
Called patient and left voicemail to confirm their upcoming appointment with Dr. Sandoval. Informed patient about arriving in the [place] location 15 minutes prior to appointment to get checked in and go over chart.

## 2024-02-12 ENCOUNTER — OFFICE VISIT (OUTPATIENT)
Dept: NEUROLOGY | Facility: CLINIC | Age: 49
End: 2024-02-12
Payer: COMMERCIAL

## 2024-02-12 VITALS
WEIGHT: 133 LBS | BODY MASS INDEX: 22.71 KG/M2 | TEMPERATURE: 97.9 F | DIASTOLIC BLOOD PRESSURE: 81 MMHG | HEIGHT: 64 IN | HEART RATE: 79 BPM | SYSTOLIC BLOOD PRESSURE: 129 MMHG

## 2024-02-12 DIAGNOSIS — G43.009 MIGRAINE WITHOUT AURA AND WITHOUT STATUS MIGRAINOSUS, NOT INTRACTABLE: Primary | ICD-10-CM

## 2024-02-12 PROCEDURE — 99214 OFFICE O/P EST MOD 30 MIN: CPT | Performed by: PSYCHIATRY & NEUROLOGY

## 2024-02-12 NOTE — PROGRESS NOTES
Review of Systems   Constitutional:  Negative for appetite change and fever.   HENT: Negative.  Negative for hearing loss, tinnitus, trouble swallowing and voice change.    Eyes: Negative.  Negative for photophobia and pain.   Respiratory: Negative.  Negative for shortness of breath.    Cardiovascular: Negative.  Negative for palpitations.   Gastrointestinal: Negative.  Negative for nausea and vomiting.   Endocrine: Negative.  Negative for cold intolerance.   Genitourinary: Negative.  Negative for dysuria, frequency and urgency.   Musculoskeletal: Negative.  Negative for myalgias and neck pain.   Skin: Negative.  Negative for rash.   Neurological:  Positive for headaches. Negative for dizziness, tremors, seizures, syncope, facial asymmetry, speech difficulty, weakness, light-headedness and numbness.   Hematological: Negative.  Does not bruise/bleed easily.   Psychiatric/Behavioral: Negative.  Negative for confusion, hallucinations and sleep disturbance.

## 2024-02-12 NOTE — PATIENT INSTRUCTIONS
[employer] is a good book on making new habits     ------          -If you feel like you could sleep on your back that night only, certainly could try to sleep on your back for the sleep study, but not if you feel like you cannot sleep this way.  Just getting sleep is the most important thing, as the machine thinks you are sleeping the entire time it is plug in. Otherwise we discussed home sleep study can underestimate the problem and try not to plug in the machine until you are just about to fall asleep.  If it comes back that you almost have sleep apnea or other sleep disorder, but not meeting criteria, we could consider in lab study and reach out to me if you would like this ordered before next visit.    I am placing a referral for a sleep study and if it is abnormal we will place one to the sleep medicine specialist team to further evaluate your sleep issues.  Please call [phone] to schedule the sleep study and afterwards if needed I recommend you see one of the following providers:  Tiago Iyer DO   Tejinder Gee PA-C            Headache/migraine treatment:   Abortive medications (for immediate treatment of a headache):   It is ok to take ibuprofen, acetaminophen or naproxen (Advil, Tylenol,  Aleve, Excedrin) if they help your headaches you should limit these to No more than 3 times a week to avoid medication overuse/rebound headaches.       For your more moderate to severe migraines take this medication early   Maxalt (rizatriptan) 10mg tabs - take one at the onset of headache. May repeat one time after 1-2 hours if pain has not resolved.   (Max 2 a day and 9 a month)     -     dexamethasone (DECADRON) 4 mg tablet; 8 mg p.o. with breakfast for 1 to 2 days and then 4 mg for 1 to 5 days for unrelenting headache    Over the counter preventive supplements for headaches/migraines (if you try, try for 3  months straight)  (to take every day to help prevent headaches - not to take at the time of headache):  [x] Magnesium 400mg daily (If any diarrhea or upset stomach, decrease dose  as tolerated)    Prescription preventive medications for headaches/migraines   (to take every day to help prevent headaches - not to take at the time of headache):  [x] Emgality/Galcanezumab - 120 mg injections every 28 days     READ INSTRUCTIONS that come with the medication. REFRIGERATE. Keep out of direct sunlight. Prior to administration, allow to come to room temperature for 30 minutes. Do not warm using a heat source (eg, microwave or hot water). Do not shake. Administer in preferably abdomen (avoiding 2 inches around the navel), thigh, upper arm, or buttocks avoiding areas of skin that are tender, bruised, red or hard. Deliver entire contents of single-use prefilled pen or syringe.      If needed could add topiramate briefly back to help with pressure and we discussed even though you already have paresthesias/tingling of the fingertips, this could temporarily make it worse it could help with the headaches and then we would wean you off.    *Typically these types of medications take time untill you see the benefit, although some may see improvement in days, often it may take weeks, especially if the medication is being titrated up to a beneficial level. Please contact us if there are any concerns or questions regarding the medication.       Lifestyle Recommendations:  [x] SLEEP - Maintain a regular sleep schedule: Adults need at least 7-8 hours of uninterrupted a night. Maintain good sleep hygiene:  Going to bed and waking up at consistent times, avoiding excessive daytime naps, avoiding caffeinated beverages in the evening, avoid excessive stimulation in the evening and generally using bed primarily for sleeping.  One hour before bedtime would recommend turning lights down lower, decreasing your activity (may read quietly, listen to  music at a low volume). When you get into bed, should eliminate all technology (no texting, emailing, playing with your phone, iPad or tablet in bed).  [x] HYDRATION - Maintain good hydration.  Drink  2L of fluid a day (4 typical small water bottles)  [x] DIET - Maintain good nutrition. In particular don't skip meals and try and eat healthy balanced meals regularly.  [x] TRIGGERS - Look for other triggers and avoid them: Limit caffeine to 1-2 cups a day or less. Avoid dietary triggers that you have noticed bring on your headaches (this could include aged cheese, peanuts, MSG, aspartame and nitrates).  [x] EXERCISE - physical exercise as we all know is good for you in many ways, and not only is good for your heart, but also is beneficial for your mental health, cognitive health and  chronic pain/headaches. I would encourage at the least 5 days of physical exercise weekly for at least 30 minutes.     Education and Follow-up  [x] Please call with any questions or concerns. Of course if any new concerning symptoms go to the emergency department.  [x] Follow up 3-4 months, sooner if needed

## 2024-02-12 NOTE — PROGRESS NOTES
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 consistent   Problems falling asleep?:   No  Problems staying asleep?:  Yes    How often do you get up at night? At least once to go to the bathroom   Do you snore while asleep?No  Do you wake up with headaches? Yes    Physical activity:  Walks outside, daily. No set exercise regimen     Water:  At least 25 oz per day per day  Caffeine:  8oz per day    Mood:   Denies history of anxiety or depression or other diagnosed mood disorder    The following portions of the patient's history were reviewed and updated as appropriate: allergies, current medications, past family history, past medical history, past social history, past surgical history and problem list.    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 [place]   Education: Highschool   Lives with lives with their spouse, mother and niece (Husbands brothers daughter)     Illicit Drugs:  denies  Alcohol/tobacco: Ocassionally on special ocassions     Past Medical History:     Past Medical History:   Diagnosis Date    Acute lymphocytic leukemia (HCC) 1983    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    Pilar cyst     Verruca        Patient Active Problem List   Diagnosis    Overactive bladder    Episodic tension-type headache, not intractable    Tobacco dependence       Medications:      Current Outpatient Medications   Medication Sig Dispense Refill    Ascorbic Acid (vitamin C) 100 MG tablet Take 100 mg by mouth daily      cyanocobalamin (VITAMIN B-12) 100 mcg tablet Take by mouth daily      Galcanezumab-gnlm 120 MG/ML SOAJ Inject 120 mg under the skin every 28 days 3 mL 3    magnesium 30 MG tablet Take 500 mg by mouth daily      oxybutynin (DITROPAN-XL) 10 MG 24 hr tablet Take 1 tablet (10 mg total) by mouth daily 90 tablet 3    rizatriptan (MAXALT) 10 mg tablet Take 1 tablet (10 mg total) by mouth once as needed for migraine May repeat in 2 hours if needed. Max 2/24 hours, 9/month. 9 tablet 12    dexamethasone (DECADRON) 4 mg tablet 8 mg p.o. with breakfast for 1 to 2 days and then 4 mg for 1 to 5 days for unrelenting headache (Patient not taking: Reported on 2/12/2024) 9 tablet 0     No current facility-administered medications for this visit.        Allergies:    No Known Allergies    Family History:     Family History   Problem Relation Age of Onset    Arthritis Mother     Hypertension Mother     Cataracts Mother     Prostate cancer Father     No Known Problems Sister     No Known Problems Sister     No Known Problems Brother     Ovarian cancer Maternal Grandmother     Heart disease Maternal Grandfather     Hypertension Maternal Grandfather     Hyperlipidemia Maternal Grandfather     Depression Maternal Grandfather     Melanoma Paternal Grandmother     Dizziness Paternal Grandmother     Diabetes Paternal Grandmother     Heart disease Paternal Grandfather     Hyperlipidemia  Paternal Grandfather     Asthma Neg Hx     COPD Neg Hx     Colon cancer Neg Hx     Breast cancer Neg Hx     Mental illness Neg Hx        Social History:     Social History     Socioeconomic History    Marital status: /Civil Union     Spouse name: Emir    Number of children: 0    Years of education: Not on file    Highest education level: Not on file   Occupational History    Not on file   Tobacco Use    Smoking status: Every Day     Current packs/day: 0.25     Average packs/day: 0.3 packs/day for 30.1 years (7.5 ttl pk-yrs)     Types: Cigarettes     Start date: 1/1/1994     Passive exposure: Current    Smokeless tobacco: Never   Vaping Use    Vaping status: Never Used   Substance and Sexual Activity    Alcohol use: Not Currently     Alcohol/week: 5.0 standard drinks of alcohol     Types: 5 Standard drinks or equivalent per week     Comment: During Dinner or After    Drug use: Never    Sexual activity: Yes     Partners: Male     Birth control/protection: None   Other Topics Concern    Not on file   Social History Narrative    Menses irregular. No menopausal symptoms    No pap, mammo      Social Determinants of Health     Financial Resource Strain: Low Risk  (11/19/2020)    Overall Financial Resource Strain (CARDIA)     Difficulty of Paying Living Expenses: Not hard at all   Food Insecurity: No Food Insecurity (11/19/2020)    Hunger Vital Sign     Worried About Running Out of Food in the Last Year: Never true     Ran Out of Food in the Last Year: Never true   Transportation Needs: No Transportation Needs (11/19/2020)    PRAPARE - Transportation     Lack of Transportation (Medical): No     Lack of Transportation (Non-Medical): No   Physical Activity: Insufficiently Active (11/19/2020)    Exercise Vital Sign     Days of Exercise per Week: 3 days     Minutes of Exercise per Session: 20 min   Stress: No Stress Concern Present (11/19/2020)    [employer] - Occupational Stress  "Questionnaire     Feeling of Stress : Not at all   Social Connections: Unknown (11/19/2020)    Social Connection and Isolation Panel [NHANES]     Frequency of Communication with Friends and Family: Not on file     Frequency of Social Gatherings with Friends and Family: Not on file     Attends Sikh Services: Not on file     Active Member of Clubs or Organizations: Not on file     Attends Club or Organization Meetings: Not on file     Marital Status:    Intimate Partner Violence: Not on file   Housing Stability: Not on file         Objective:         Physical Exam:                                                                 Vitals:            Constitutional:    /81 (BP Location: Right arm, Patient Position: Sitting, Cuff Size: Standard)   Pulse 79   Temp 97.9 °F (36.6 °C) (Temporal)   Ht 5' 4\" (1.626 m)   Wt 60.3 kg (133 lb)   BMI 22.83 kg/m²   BP Readings from Last 3 Encounters:   02/12/24 129/81   12/08/23 120/78   11/06/23 121/70     Pulse Readings from Last 3 Encounters:   02/12/24 79   11/06/23 70   10/19/23 78         Well developed, well nourished, well groomed.        Psychiatric:  Normal behavior and appropriate affect        Neurological Examination:     Mental status/cognitive function:   Recent and remote memory appear intact. Attention span and concentration as well as fund of knowledge are appropriate for age. Normal language and spontaneous speech.  Cranial Nerves:   VII-facial expression symmetric  Motor Exam: symmetric bulk throughout. no atrophy, fasciculations or abnormal movements noted.   Coordination:  no apparent dysmetria, ataxia or tremor noted  Gait: steady casual gait            Pertinent lab results:   See EMR for recent labs  12/22/2020 CMP with chloride 109, magnesium 2.7   B12 1354  CBC unremarkable  Normal TSH with elevated thyroglobulin antibody and thyroid microsomal antibody        Imaging:   -  CTA head with and without contrast 8/13/21: CT brain: White " matter changes suggestive of chronic microangiopathy. CT angiography: Normal intracranial vasculature.     - MRI brain  With without contrast 05/06/2021: No acute intracranial abnormality or pathologic intracranial enhancement Nonspecific cerebral white matter signal abnormality without edema or enhancement.  These changes may represent a combination of migraine induced changes, sequela of small vessel ischemic disease, and posttreatment changes.  Other etiologies such as demyelinating disease, vasculitis and/or collagen vascular disease are less likely considerations but difficult to entirely exclude on the basis of this single examination.  Comparison with prior studies would be helpful, particularly to ensure stability of these white matter findings  *Discussed MRI Brain with our Demyelination specialist and she did not feel it was consistent with MS (also no history of MS symptoms), she recommended CTA of the head to further evaluate for vascular etiology of the white matter changes (which was unremarkable)  We also discussed certainly could be post treatment changes related to past brain radiation with cobalt. Worse than 2008  *As of my retrospective review 11/6/2023 she has a partially empty sella, question of mild optic nerve sheath prominence bilaterally with slight tortuosity in my opinion, cerebellar tonsils overlay the foramen magnum with tight junction without Chiari     Per patient - MRI brain at [facility] for a cyst in her scalp. No brain cyst   - as of 07/01/2022 MRI brain images uploaded from 11/19/2008 and 4/14/08     Review of Systems:   ROS obtained by medical assistant and personally reviewed, but if any symptoms listed below say negative, does not mean patient has not had this symptom since last visit, please see HPI for details of symptoms discussed this visit. I recommended PCP follow up for non neurologic problems.    Review of Systems   Constitutional:  Negative for appetite change  and fever.   HENT: Negative.  Negative for hearing loss, tinnitus, trouble swallowing and voice change.    Eyes: Negative.  Negative for photophobia and pain.   Respiratory: Negative.  Negative for shortness of breath.    Cardiovascular: Negative.  Negative for palpitations.   Gastrointestinal: Negative.  Negative for nausea and vomiting.   Endocrine: Negative.  Negative for cold intolerance.   Genitourinary: Negative.  Negative for dysuria, frequency and urgency.   Musculoskeletal: Negative.  Negative for myalgias and neck pain.   Skin: Negative.  Negative for rash.   Neurological:  Positive for headaches. Negative for dizziness, tremors, seizures, syncope, facial asymmetry, speech difficulty, weakness, light-headedness and numbness.   Hematological: Negative.  Does not bruise/bleed easily.   Psychiatric/Behavioral: Negative.  Negative for confusion, hallucinations and sleep disturbance.         I have spent 25  minutes with Patient  today in which greater than 50% of this time was spent in counseling/coordination of care regarding Diagnostic results, Prognosis, Risks and benefits of tx options, Instructions for management, Patient and family education, Importance of tx compliance, Risk factor reductions, Impressions, Counseling / Coordination of care, Documenting in the medical record, Reviewing / ordering tests, medicine, procedures  , Obtaining or reviewing history  , and Communicating with other healthcare professionals . I also spent 6 minutes non face to face for this patient the same day.       Author:  Ida Sandoval MD 2/12/2024 9:38 AM

## 2024-03-28 ENCOUNTER — HOSPITAL ENCOUNTER (OUTPATIENT)
Dept: SLEEP CENTER | Facility: CLINIC | Age: 49
Discharge: HOME | End: 2024-03-28
Payer: COMMERCIAL

## 2024-03-28 DIAGNOSIS — G47.33 OBSTRUCTIVE SLEEP APNEA (ADULT) (PEDIATRIC): ICD-10-CM

## 2024-03-28 PROCEDURE — G0399 HOME SLEEP TEST/TYPE 3 PORTA: HCPCS

## 2024-03-28 NOTE — PROGRESS NOTES
Home Sleep Study Documentation    HOME STUDY DEVICE: Noxturnal no                                           Abigail G3 yes      Pre-Sleep Home Study:    Set-up and instructions performed by: Alejandra    Technician performed demonstration for Patient: yes    Return demonstration performed by Patient: yes    Written instructions provided to Patient: yes    Patient signed consent form: yes        Post-Sleep Home Study:    Additional comments by Patient: pending    Home Sleep Study Failed:pending    Failure reason: pending    Reported or Detected: pending    Scored by: pending

## 2024-04-02 PROBLEM — G47.33 OBSTRUCTIVE SLEEP APNEA (ADULT) (PEDIATRIC): Status: ACTIVE | Noted: 2024-04-02

## 2024-04-29 ENCOUNTER — TELEPHONE (OUTPATIENT)
Dept: SLEEP CENTER | Facility: CLINIC | Age: 49
End: 2024-04-29

## 2024-04-29 NOTE — TELEPHONE ENCOUNTER
Home sleep study resulted, does not show BETO.  Per study order patient to consult a sleep specialist.    Call placed to patient, left call back message.    Big [employer] message sent providing results and recommendations.

## 2024-06-19 ENCOUNTER — TELEPHONE (OUTPATIENT)
Dept: NEUROLOGY | Facility: CLINIC | Age: 49
End: 2024-06-19

## 2024-06-19 NOTE — TELEPHONE ENCOUNTER
Called and spoke to patient to confirm their upcoming appointment with Dr. Sandoval. Informed patient about arriving in the [place] location 15 minutes prior to their appointment to get checked in and going over chart.

## 2024-06-24 ENCOUNTER — OFFICE VISIT (OUTPATIENT)
Dept: NEUROLOGY | Facility: CLINIC | Age: 49
End: 2024-06-24
Payer: COMMERCIAL

## 2024-06-24 VITALS
DIASTOLIC BLOOD PRESSURE: 71 MMHG | HEIGHT: 64 IN | SYSTOLIC BLOOD PRESSURE: 117 MMHG | BODY MASS INDEX: 22.02 KG/M2 | TEMPERATURE: 98.1 F | WEIGHT: 129 LBS | HEART RATE: 81 BPM

## 2024-06-24 DIAGNOSIS — G43.009 MIGRAINE WITHOUT AURA AND WITHOUT STATUS MIGRAINOSUS, NOT INTRACTABLE: Primary | ICD-10-CM

## 2024-06-24 PROBLEM — G47.33 OBSTRUCTIVE SLEEP APNEA (ADULT) (PEDIATRIC): Status: RESOLVED | Noted: 2024-04-02 | Resolved: 2024-06-24

## 2024-06-24 PROCEDURE — 99215 OFFICE O/P EST HI 40 MIN: CPT | Performed by: PSYCHIATRY & NEUROLOGY

## 2024-06-24 NOTE — PATIENT INSTRUCTIONS
Look online at videos for SI joint pain and try those otherwise discuss with PT or PCP     Consider aspects of anti-inflammatory diet if you would like    Let me know if you would ever want sleep study in lab     [employer] habits is a good book on making new habits     Glad you are quitting smoking especially considering the white matter changes on MRI Brain and continue secondary risk factor control with PCP.        Headache/migraine treatment:   Abortive medications (for immediate treatment of a headache):   It is ok to take ibuprofen, acetaminophen or naproxen (Advil, Tylenol,  Aleve, Excedrin) if they help your headaches you should limit these to No more than 3 times a week to avoid medication overuse/rebound headaches.       For your more moderate to severe migraines take this medication early   Maxalt (rizatriptan) 10mg tabs - take one at the onset of headache. May repeat one time after 1-2 hours if pain has not resolved.   (Max 2 a day and 9 a month)     -     dexamethasone (DECADRON) 4 mg tablet; 8 mg p.o. with breakfast for 1 to 2 days and then 4 mg for 1 to 5 days for unrelenting headache    Over the counter preventive supplements for headaches/migraines (if you try, try for 3 months straight)  (to take every day to help prevent headaches - not to take at the time of headache):  [x] Magnesium 400mg daily (If any diarrhea or upset stomach, decrease dose  as tolerated)      Prescription preventive medications for headaches/migraines   (to take every day to help prevent headaches - not to take at the time of headache):  [x] Emgality/Galcanezumab - 120 mg injections every 28 days     READ INSTRUCTIONS that come with the medication. REFRIGERATE. Keep out of direct sunlight. Prior to administration, allow to come to room temperature for 30 minutes. Do not warm using a heat source (eg, microwave or hot water). Do not shake. Administer in preferably abdomen (avoiding 2 inches around the navel), thigh, upper arm, or  buttocks avoiding areas of skin that are tender, bruised, red or hard. Deliver entire contents of single-use prefilled pen or syringe.  -----    If needed could add topiramate briefly back to help with pressure and we discussed even though you already have paresthesias/tingling of the fingertips, this could temporarily make it worse it could help with the headaches and then we would wean you off.    *Typically these types of medications take time untill you see the benefit, although some may see improvement in days, often it may take weeks, especially if the medication is being titrated up to a beneficial level. Please contact us if there are any concerns or questions regarding the medication.       Lifestyle Recommendations:  [x] SLEEP - Maintain a regular sleep schedule: Adults need at least 7-8 hours of uninterrupted a night. Maintain good sleep hygiene:  Going to bed and waking up at consistent times, avoiding excessive daytime naps, avoiding caffeinated beverages in the evening, avoid excessive stimulation in the evening and generally using bed primarily for sleeping.  One hour before bedtime would recommend turning lights down lower, decreasing your activity (may read quietly, listen to music at a low volume). When you get into bed, should eliminate all technology (no texting, emailing, playing with your phone, iPad or tablet in bed).  [x] HYDRATION - Maintain good hydration.  Drink  2L of fluid a day (4 typical small water bottles)  [x] DIET - Maintain good nutrition. In particular don't skip meals and try and eat healthy balanced meals regularly.  [x] TRIGGERS - Look for other triggers and avoid them: Limit caffeine to 1-2 cups a day or less. Avoid dietary triggers that you have noticed bring on your headaches (this could include aged cheese, peanuts, MSG, aspartame and nitrates).  [x] EXERCISE - physical exercise as we all know is good for you in many ways, and not only is good for your heart, but also is  beneficial for your mental health, cognitive health and  chronic pain/headaches. I would encourage at the least 5 days of physical exercise weekly for at least 30 minutes.     Education and Follow-up  [x] Please call with any questions or concerns. Of course if any new concerning symptoms go to the emergency department.  [x] Follow up 3-6 months, sooner if needed

## 2024-06-24 NOTE — PROGRESS NOTES
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  Work (or school) days: Housewife   Social or Family activities: No     Things that make the headache worse? No specific movements    In springtime headaches increased in intensity. Headaches prior to that were no lower than an 8    Headache triggers:  Unknown, weather changes      Have you seen someone else for headaches or pain? Yes, Just primary  Have you had trigger point injection performed and how often? No  Have you had Botox injection performed and how often? No   Have you had epidural injections or transforaminal injections performed? No  Are you current pregnant or planning on getting pregnant? No, random periods   Have you ever had any Brain imaging? yes in 2012 at [facility] for a cyst in her scalp. No brain cyst     What medications do you take or have you taken for your headaches?   ABORTIVE:    OTC medications have been ineffective     Aleve- Helped slightly. If 10 or above, doesn't help  Excedrin - Same as above   Tylenol - Doesn't help at all   Motrin - Doesn't help at all     PREVENTIVE:   Magnesium 500 mg daily   B2     * Takes Vit C       Alternative therapies used in the past for headaches? decrease caffeine intake No changes noted    Neck pain?: No    LIFESTYLE  Sleep   - averages: 6-8 " H, not consistent   Problems falling asleep?:   No  Problems staying asleep?:  Yes    How often do you get up at night? At least once to go to the bathroom   Do you snore while asleep?No  Do you wake up with headaches? Yes    Physical activity:  Walks outside, daily. No set exercise regimen     Water:  At least 25 oz per day per day  Caffeine:  8oz per day    Mood:   Denies history of anxiety or depression or other diagnosed mood disorder    The following portions of the patient's history were reviewed and updated as appropriate: allergies, current medications, past family history, past medical history, past social history, past surgical history and problem list.    Pertinent family history:  Family history of headaches:  no known family members with significant headaches   Any family history of aneurysms - No   Paternal grandmother melanoma mets to brain   Father prostate cancer mets to brain      Pertinent social history:  Work: Housewife, now meevl department in [place]   Education: Highschool   Lives with lives with their spouse, mother and niece (Husbands brothers daughter)     Illicit Drugs: denies  Alcohol/tobacco: Ocassionally on special ocassions            Pertinent lab results:   See EMR for recent labs  12/22/2020 CMP with chloride 109, magnesium 2.7   B12 1354  CBC unremarkable  Normal TSH with elevated thyroglobulin antibody and thyroid microsomal antibody        Imaging:   -  CTA head with and without contrast 8/13/21: CT brain: White matter changes suggestive of chronic microangiopathy. CT angiography: Normal intracranial vasculature.     - MRI brain  With without contrast 05/06/2021: No acute intracranial abnormality or pathologic intracranial enhancement Nonspecific cerebral white matter signal abnormality without edema or enhancement.  These changes may represent a combination of migraine induced changes, sequela of small vessel ischemic disease, and posttreatment changes.  Other etiologies  such as demyelinating disease, vasculitis and/or collagen vascular disease are less likely considerations but difficult to entirely exclude on the basis of this single examination.  Comparison with prior studies would be helpful, particularly to ensure stability of these white matter findings  *Discussed MRI Brain with our Demyelination specialist and she did not feel it was consistent with MS (also no history of MS symptoms), she recommended CTA of the head to further evaluate for vascular etiology of the white matter changes (which was unremarkable)  We also discussed certainly could be post treatment changes related to past brain radiation with cobalt. Worse than 2008  *As of my retrospective review 11/6/2023 she has a partially empty sella, question of mild optic nerve sheath prominence bilaterally with slight tortuosity in my opinion, cerebellar tonsils overlay the foramen magnum with tight junction without Chiari     Per patient - MRI brain at [facility]3] for a cyst in her scalp. 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    ALL (acute lymphoid leukemia) in remission ([facility]) 11/19/2020    Pilar cyst     Verruca        Patient Active Problem List   Diagnosis    Overactive bladder    Episodic tension-type headache, not intractable    Tobacco dependence       Medications:      Current Outpatient Medications   Medication Sig Dispense Refill    Ascorbic Acid (vitamin C) 100 MG tablet Take 100 mg by mouth daily      cyanocobalamin (VITAMIN B-12) 100 mcg tablet Take by mouth daily      Galcanezumab-gnlm 120 MG/ML SOAJ Inject 120 mg under the skin every 28 days 3 mL 4    magnesium 30 MG tablet Take 500 mg by mouth daily      Multiple Vitamins-Minerals (Hair Skin and Nails Formula) TABS Take 1 tablet by mouth in the morning      oxybutynin (DITROPAN-XL) 10 MG  24 hr tablet Take 1 tablet (10 mg total) by mouth daily 90 tablet 3    rizatriptan (MAXALT) 10 mg tablet Take 1 tablet (10 mg total) by mouth once as needed for migraine May repeat in 2 hours if needed. Max 2/24 hours, 9/month. 9 tablet 12     No current facility-administered medications for this visit.        Allergies:    No Known Allergies    Family History:     Family History   Problem Relation Age of Onset    Arthritis Mother     Hypertension Mother     Cataracts Mother     Prostate cancer Father     No Known Problems Sister     No Known Problems Sister     No Known Problems Brother     Ovarian cancer Maternal Grandmother     Heart disease Maternal Grandfather     Hypertension Maternal Grandfather     Hyperlipidemia Maternal Grandfather     Depression Maternal Grandfather     Melanoma Paternal Grandmother     Dizziness Paternal Grandmother     Diabetes Paternal Grandmother     Heart disease Paternal Grandfather     Hyperlipidemia Paternal Grandfather     Asthma Neg Hx     COPD Neg Hx     Colon cancer Neg Hx     Breast cancer Neg Hx     Mental illness Neg Hx        Social History:     Social History     Socioeconomic History    Marital status: /Civil Union     Spouse name: Emir    Number of children: 0    Years of education: Not on file    Highest education level: Not on file   Occupational History    Not on file   Tobacco Use    Smoking status: Every Day     Current packs/day: 0.25     Average packs/day: 0.3 packs/day for 30.5 years (7.6 ttl pk-yrs)     Types: Cigarettes     Start date: 1/1/1994     Passive exposure: Current    Smokeless tobacco: Never   Vaping Use    Vaping status: Never Used   Substance and Sexual Activity    Alcohol use: Not Currently     Alcohol/week: 5.0 standard drinks of alcohol     Types: 5 Standard drinks or equivalent per week     Comment: During Dinner or After    Drug use: Never    Sexual activity: Yes     Partners: Male     Birth control/protection: None   Other Topics  "Concern    Not on file   Social History Narrative    Menses irregular. No menopausal symptoms    No pap, mammo      Social Determinants of Health     Financial Resource Strain: Low Risk  (11/19/2020)    Overall Financial Resource Strain (CARDIA)     Difficulty of Paying Living Expenses: Not hard at all   Food Insecurity: No Food Insecurity (11/19/2020)    Hunger Vital Sign     Worried About Running Out of Food in the Last Year: Never true     Ran Out of Food in the Last Year: Never true   Transportation Needs: No Transportation Needs (11/19/2020)    PRAPARE - Transportation     Lack of Transportation (Medical): No     Lack of Transportation (Non-Medical): No   Physical Activity: Insufficiently Active (11/19/2020)    Exercise Vital Sign     Days of Exercise per Week: 3 days     Minutes of Exercise per Session: 20 min   Stress: No Stress Concern Present (11/19/2020)    [employer] [place] [employer] - Occupational Stress Questionnaire     Feeling of Stress : Not at all   Social Connections: Unknown (11/19/2020)    Social Connection and Isolation Panel [NHANES]     Frequency of Communication with Friends and Family: Not on file     Frequency of Social Gatherings with Friends and Family: Not on file     Attends Gnosticist Services: Not on file     Active Member of Clubs or Organizations: Not on file     Attends Club or Organization Meetings: Not on file     Marital Status:    Intimate Partner Violence: Not on file   Housing Stability: Not on file         Objective:         Physical Exam:                                                                 Vitals:            Constitutional:    /71 (BP Location: Right arm, Patient Position: Sitting, Cuff Size: Standard)   Pulse 81   Temp 98.1 °F (36.7 °C) (Temporal)   Ht 5' 4\" (1.626 m)   Wt 58.5 kg (129 lb)   BMI 22.14 kg/m²   BP Readings from Last 3 Encounters:   06/24/24 117/71   02/12/24 129/81   12/08/23 120/78     Pulse Readings from Last 3 " Encounters:   06/24/24 81   02/12/24 79   11/06/23 70         Well developed, well nourished, well groomed.        Psychiatric:  Normal behavior and appropriate affect        Neurological Examination:     Mental status/cognitive function:   Recent and remote memory appear intact. Attention span and concentration as well as fund of knowledge are appropriate for age. Normal language and spontaneous speech.  Cranial Nerves:   VII-facial expression symmetric  Motor Exam: symmetric bulk throughout. no atrophy, fasciculations or abnormal movements noted.   Coordination:  no apparent dysmetria, ataxia or tremor noted  Gait: steady casual gait          Review of Systems:   ROS obtained by medical assistant and reviewed, but if any symptoms listed below say negative, does not mean patient has not had this symptom since last visit, please see HPI for details of symptoms discussed this visit.  Regarding any abnormal or positive findings in ROS that are not neurologic related, patient instructed to address these issues with PCP or go to the ER if they are severe.      Review of Systems   Constitutional:  Negative for appetite change and fever.   HENT: Negative.  Negative for hearing loss, tinnitus, trouble swallowing and voice change.    Eyes: Negative.  Negative for photophobia and pain.   Respiratory: Negative.  Negative for shortness of breath.    Cardiovascular: Negative.  Negative for palpitations.   Gastrointestinal: Negative.  Negative for nausea and vomiting.   Endocrine: Negative.  Negative for cold intolerance.   Genitourinary: Negative.  Negative for dysuria, frequency and urgency.   Musculoskeletal: Negative.  Negative for myalgias and neck pain.   Skin: Negative.  Negative for rash.   Neurological:  Positive for headaches. Negative for dizziness, tremors, seizures, syncope, facial asymmetry, speech difficulty, weakness, light-headedness and numbness.   Hematological: Negative.  Does not bruise/bleed easily.    Psychiatric/Behavioral: Negative.  Negative for confusion, hallucinations and sleep disturbance.    All other systems reviewed and are negative.       I have spent 28 minutes with Patient  today in which greater than 50% of this time was spent in counseling/coordination of care regarding Diagnostic results, Prognosis, Risks and benefits of tx options, Instructions for management, Patient education, Importance of tx compliance, Risk factor reductions, Impressions, Counseling / Coordination of care, Documenting in the medical record, Reviewing / ordering tests, medicine, procedures  , Obtaining or reviewing history  , and Communicating with other healthcare professionals . I also spent 12 minutes non face to face for this patient the same day.       Author:  Ida Sandoval MD 6/24/2024 12:32 PM

## 2024-10-07 ENCOUNTER — OFFICE VISIT (OUTPATIENT)
Dept: NEUROLOGY | Facility: CLINIC | Age: 49
End: 2024-10-07
Payer: COMMERCIAL

## 2024-10-07 VITALS
WEIGHT: 128 LBS | SYSTOLIC BLOOD PRESSURE: 123 MMHG | DIASTOLIC BLOOD PRESSURE: 73 MMHG | BODY MASS INDEX: 21.85 KG/M2 | HEART RATE: 75 BPM | TEMPERATURE: 98.3 F | HEIGHT: 64 IN

## 2024-10-07 DIAGNOSIS — G43.009 MIGRAINE WITHOUT AURA AND WITHOUT STATUS MIGRAINOSUS, NOT INTRACTABLE: Primary | ICD-10-CM

## 2024-10-07 PROCEDURE — 99214 OFFICE O/P EST MOD 30 MIN: CPT | Performed by: PSYCHIATRY & NEUROLOGY

## 2024-10-07 RX ORDER — RIZATRIPTAN BENZOATE 10 MG/1
10 TABLET ORAL ONCE AS NEEDED
Qty: 9 TABLET | Refills: 12 | Status: SHIPPED | OUTPATIENT
Start: 2024-10-07

## 2024-10-07 NOTE — PROGRESS NOTES
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 to only 2-3 per month that respond to rizatriptan    Preventative: Trial of amitriptyline 50 mg - makes her tired, takes at 8 makes her sleepy through till 10 am   -  Magnesium,  riboflavin  Abortive: trial of rizatriptan - usually helps without side effects   -  Trial of Decadron 2 mg - took 2 days and side effects of dizzy and nausea     Headaches started at what age? Seasonal headaches in teens and 20s.   Worsened back in November 2020  How often do the headaches occur?   - As of 11/19/2020 headaches every 2-3 days  - as of 4/13/2021: Daily for last 2 months, multiple times a day. Pattern inconsistent. No days without headache.  Worse 3 days a week.   What time of the day do the headaches start?  No particular time of day , various times throughtout the day  How long do the headaches last? 1H - to 24 hours   Are you ever headache free? yes    Aura? without aura     Last eye exam: Last eye exam was in November 2020. Her vision has gotten better. Lens Rx changed. Right mostly, unclear if left.     Where is your headache located and pain quality? Varies, sinus, occiput, diffuse.  - can be unilateral   - not consistently in one spot   What is the intensity of pain? Average: 9/10, worst 10/10  Associated  "symptoms:   [] Nausea       [] Vomiting        [x] Decreased appetite  [] Insomnia     [] Stiff or sore neck   [x] Problems with concentration   [] Photophobia     []Phonophobia      [x] Osmophobia - chronically sensitive   [] Blurred vision   [] Prefer quiet, dark room  [x] Light-headed or dizzy   With standing up   [x] Tinnitus   [x] Hands or feet tingle or feel numb/paresthesias   (Numbness especially at night and when it is cold)      [] Red ear      [x] Ptosis (\"As if you get something stuck i8n your eyes and it feels like it wont open\") - either eye, randomly and can happen while walking - not assicated with headache     [] Facial droop  [x] Lacrimation - with headache and with seasonal allergies   [x] Nasal congestion/rhinorrhea  - with headache and with seasonal allergies    [] Flushing of face  [] Change in pupil size    Number of days missed per month because of headaches:  Work (or school) days: Housewife   Social or Family activities: No     Things that make the headache worse? No specific movements    In springtime headaches increased in intensity. Headaches prior to that were no lower than an 8    Headache triggers:  Unknown, weather changes      Have you seen someone else for headaches or pain? Yes, Just primary  Have you had trigger point injection performed and how often? No  Have you had Botox injection performed and how often? No   Have you had epidural injections or transforaminal injections performed? No  Are you current pregnant or planning on getting pregnant? No, random periods   Have you ever had any Brain imaging? yes in 2012 at [facility] for a cyst in her scalp. No brain cyst     What medications do you take or have you taken for your headaches?   ABORTIVE:    OTC medications have been ineffective     Aleve- Helped slightly. If 10 or above, doesn't help  Excedrin - Same as above   Tylenol - Doesn't help at all   Motrin - Doesn't help at all     PREVENTIVE:   Magnesium 500 mg " daily   B2     * Takes Vit C       Alternative therapies used in the past for headaches? decrease caffeine intake No changes noted    Neck pain?: No    LIFESTYLE  Sleep   - averages: 6-8 H, not consistent   Problems falling asleep?:   No  Problems staying asleep?:  Yes    How often do you get up at night? At least once to go to the bathroom   Do you snore while asleep?No  Do you wake up with headaches? Yes    Physical activity:  Walks outside, daily. No set exercise regimen     Water:  At least 25 oz per day per day  Caffeine:  8oz per day    Mood:   Denies history of anxiety or depression or other diagnosed mood disorder    The following portions of the patient's history were reviewed and updated as appropriate: allergies, current medications, past family history, past medical history, past social history, past surgical history and problem list.    Pertinent family history:  Family history of headaches:  no known family members with significant headaches   Any family history of aneurysms - No   Paternal grandmother melanoma mets to brain   Father prostate cancer mets to brain      Pertinent social history:  Work: Housewife, now [employer] in [place]   Education: Highschool   Lives with lives with their spouse, mother and niece (Husbands brothers daughter)     Illicit Drugs: denies  Alcohol/tobacco: Ocassionally on special ocassions            Pertinent lab results:   See EMR for recent labs  12/22/2020 CMP with chloride 109, magnesium 2.7   B12 1354  CBC unremarkable  Normal TSH with elevated thyroglobulin antibody and thyroid microsomal antibody        Imaging:   -  CTA head with and without contrast 8/13/21: CT brain: White matter changes suggestive of chronic microangiopathy. CT angiography: Normal intracranial vasculature.     - MRI brain  With without contrast 05/06/2021: No acute intracranial abnormality or pathologic intracranial enhancement Nonspecific cerebral white matter signal abnormality  "without edema or enhancement.  These changes may represent a combination of migraine induced changes, sequela of small vessel ischemic disease, and posttreatment changes.  Other etiologies such as demyelinating disease, vasculitis and/or collagen vascular disease are less likely considerations but difficult to entirely exclude on the basis of this single examination.  Comparison with prior studies would be helpful, particularly to ensure stability of these white matter findings  *Discussed MRI Brain with our Demyelination specialist and she did not feel it was consistent with MS (also no history of MS symptoms), she recommended CTA of the head to further evaluate for vascular etiology of the white matter changes (which was unremarkable)  We also discussed certainly could be post treatment changes related to past brain radiation with cobalt. Worse than 2008  *As of my retrospective review 11/6/2023 she has a partially empty sella, question of mild optic nerve sheath prominence bilaterally with slight tortuosity in my opinion, cerebellar tonsils overlay the foramen magnum with tight junction without Chiari     Per patient - [facility] brain at [facility] for a cyst in her scalp. No brain cyst   - as of 07/01/2022 MRI brain images uploaded from 11/19/2008 and 4/14/08        Objective       Physical Exam:                                                                 Vitals:            Constitutional:    /73 (BP Location: Right arm, Patient Position: Sitting, Cuff Size: Standard)   Pulse 75   Temp 98.3 °F (36.8 °C) (Temporal)   Ht 5' 4\" (1.626 m)   Wt 58.1 kg (128 lb)   BMI 21.97 kg/m²   BP Readings from Last 3 Encounters:   10/07/24 123/73   06/24/24 117/71   02/12/24 129/81     Pulse Readings from Last 3 Encounters:   10/07/24 75   06/24/24 81   02/12/24 79         Well developed, well nourished, well groomed.        Psychiatric:  Normal behavior and appropriate affect        Able to answer questions " appropriately, provide history of recent events   Normal language and spontaneous speech.  facial expression symmetric  symmetric bulk throughout. no atrophy, fasciculations or significant abnormal movements noted during our visit from observation.   steady casual gait       ROS:  ROS obtained by medical assistant and reviewed, but if any symptoms listed below say negative, does not mean patient has not had this symptom since last visit, please see HPI for details of symptoms discussed this visit.  Regarding any abnormal or positive findings in ROS that are not neurologic related, patient instructed to address these issues with PCP or go to the ER if they are severe.    Review of Systems   Constitutional:  Negative for appetite change and fever.   HENT: Negative.  Negative for hearing loss, tinnitus, trouble swallowing and voice change.    Eyes: Negative.  Negative for photophobia and pain.   Respiratory: Negative.  Negative for shortness of breath.    Cardiovascular: Negative.  Negative for palpitations.   Gastrointestinal: Negative.  Negative for nausea and vomiting.   Endocrine: Negative.  Negative for cold intolerance.   Genitourinary: Negative.  Negative for dysuria, frequency and urgency.   Musculoskeletal: Negative.  Negative for myalgias and neck pain.   Skin: Negative.  Negative for rash.   Neurological:  Positive for headaches. Negative for dizziness, tremors, seizures, syncope, facial asymmetry, speech difficulty, weakness, light-headedness and numbness.   Hematological: Negative.  Does not bruise/bleed easily.   Psychiatric/Behavioral: Negative.  Negative for confusion, hallucinations and sleep disturbance.

## 2024-10-07 NOTE — PATIENT INSTRUCTIONS
Consider aspects of anti-inflammatory diet if you would like    Let me know if you would ever want sleep study in lab     Atomic habits is a good book on making new habits     Glad you are quitting smoking especially considering the white matter changes on MRI Brain and continue secondary risk factor control with PCP.      Headache/migraine treatment:   Abortive medications (for immediate treatment of a headache):   It is ok to take ibuprofen, acetaminophen or naproxen (Advil, Tylenol,  Aleve, Excedrin) if they help your headaches you should limit these to No more than 3 times a week to avoid medication overuse/rebound headaches.       For your more moderate to severe migraines take this medication early   Maxalt (rizatriptan) 10mg tabs - take one at the onset of headache. May repeat one time after 1-2 hours if pain has not resolved.   (Max 2 a day and 9 a month)     -     dexamethasone (DECADRON) 4 mg tablet; 8 mg p.o. with breakfast for 1 to 2 days and then 4 mg for 1 to 5 days for unrelenting headache    Over the counter preventive supplements for headaches/migraines (if you try, try for 3 months straight)  (to take every day to help prevent headaches - not to take at the time of headache):  [x] Magnesium 400mg daily (If any diarrhea or upset stomach, decrease dose  as tolerated)      Prescription preventive medications for headaches/migraines   (to take every day to help prevent headaches - not to take at the time of headache):  [x] Emgality/Galcanezumab - 120 mg injections every 28 days     READ INSTRUCTIONS that come with the medication. REFRIGERATE. Keep out of direct sunlight. Prior to administration, allow to come to room temperature for 30 minutes. Do not warm using a heat source (eg, microwave or hot water). Do not shake. Administer in preferably abdomen (avoiding 2 inches around the navel), thigh, upper arm, or buttocks avoiding areas of skin that are tender, bruised, red or hard. Deliver entire contents  of single-use prefilled pen or syringe.  -----    If needed could add topiramate briefly back to help with pressure and we discussed even though you already have paresthesias/tingling of the fingertips, this could temporarily make it worse it could help with the headaches and then we would wean you off.    *Typically these types of medications take time untill you see the benefit, although some may see improvement in days, often it may take weeks, especially if the medication is being titrated up to a beneficial level. Please contact us if there are any concerns or questions regarding the medication.       Lifestyle Recommendations:  [x] SLEEP - Maintain a regular sleep schedule: Adults need at least 7-8 hours of uninterrupted a night. Maintain good sleep hygiene:  Going to bed and waking up at consistent times, avoiding excessive daytime naps, avoiding caffeinated beverages in the evening, avoid excessive stimulation in the evening and generally using bed primarily for sleeping.  One hour before bedtime would recommend turning lights down lower, decreasing your activity (may read quietly, listen to music at a low volume). When you get into bed, should eliminate all technology (no texting, emailing, playing with your phone, iPad or tablet in bed).  [x] HYDRATION - Maintain good hydration.  Drink  2L of fluid a day (4 typical small water bottles)  [x] DIET - Maintain good nutrition. In particular don't skip meals and try and eat healthy balanced meals regularly.  [x] TRIGGERS - Look for other triggers and avoid them: Limit caffeine to 1-2 cups a day or less. Avoid dietary triggers that you have noticed bring on your headaches (this could include aged cheese, peanuts, MSG, aspartame and nitrates).  [x] EXERCISE - physical exercise as we all know is good for you in many ways, and not only is good for your heart, but also is beneficial for your mental health, cognitive health and  chronic pain/headaches. I would  encourage at the least 5 days of physical exercise weekly for at least 30 minutes.     Education and Follow-up  [x] Please call with any questions or concerns. Of course if any new concerning symptoms go to the emergency department.  [x] Follow up 6 months, sooner if needed
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
## 2025-01-17 DIAGNOSIS — N32.81 OVERACTIVE BLADDER: ICD-10-CM

## 2025-01-17 NOTE — TELEPHONE ENCOUNTER
Refill must be reviewed and completed by the office or provider. The refill is unable to be approved or denied by the medication management team.    Patient not seen > 1 year

## 2025-01-24 RX ORDER — OXYBUTYNIN CHLORIDE 10 MG/1
10 TABLET, EXTENDED RELEASE ORAL DAILY
Qty: 30 TABLET | Refills: 0 | Status: SHIPPED | OUTPATIENT
Start: 2025-01-24

## 2025-04-03 ENCOUNTER — OFFICE VISIT (OUTPATIENT)
Dept: NEUROLOGY | Facility: CLINIC | Age: 50
End: 2025-04-03
Payer: COMMERCIAL

## 2025-04-03 VITALS
SYSTOLIC BLOOD PRESSURE: 143 MMHG | HEIGHT: 64 IN | BODY MASS INDEX: 24.24 KG/M2 | TEMPERATURE: 98.2 F | HEART RATE: 96 BPM | DIASTOLIC BLOOD PRESSURE: 90 MMHG | WEIGHT: 142 LBS

## 2025-04-03 DIAGNOSIS — G43.009 MIGRAINE WITHOUT AURA AND WITHOUT STATUS MIGRAINOSUS, NOT INTRACTABLE: ICD-10-CM

## 2025-04-03 PROCEDURE — 99214 OFFICE O/P EST MOD 30 MIN: CPT | Performed by: PSYCHIATRY & NEUROLOGY

## 2025-04-03 RX ORDER — DEXAMETHASONE 4 MG/1
TABLET ORAL
Qty: 5 TABLET | Refills: 0 | Status: SHIPPED | OUTPATIENT
Start: 2025-04-03
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
## 2025-04-03 NOTE — PATIENT INSTRUCTIONS
Consider aspects of anti-inflammatory diet if you would like    Let me know if you would ever want sleep study in lab     Atomic habits is a good book on making new habits     Glad you are quitting smoking especially considering the white matter changes on MRI Brain and continue secondary risk factor control with PCP.      Headache/migraine treatment:   Abortive medications (for immediate treatment of a headache):   It is ok to take ibuprofen, acetaminophen or naproxen (Advil, Tylenol,  Aleve, Excedrin) if they help your headaches you should limit these to No more than 3 times a week to avoid medication overuse/rebound headaches.       For your more moderate to severe migraines take this medication early   Maxalt (rizatriptan) 10mg tabs - take one at the onset of headache. May repeat one time after 1-2 hours if pain has not resolved.   (Max 2 a day and 9 a month)       -     dexamethasone (DECADRON) 4 mg tablet; 4 mg p.o. with breakfast for 1 to 5 days for unrelenting migraine        Over the counter preventive supplements for headaches/migraines (if you try, try for 3 months straight)  (to take every day to help prevent headaches - not to take at the time of headache):  [x] Magnesium 400mg daily (If any diarrhea or upset stomach, decrease dose  as tolerated)      Prescription preventive medications for headaches/migraines   (to take every day to help prevent headaches - not to take at the time of headache):  [x] Emgality/Galcanezumab - 120 mg injections every 28 days     READ INSTRUCTIONS that come with the medication. REFRIGERATE. Keep out of direct sunlight. Prior to administration, allow to come to room temperature for 30 minutes. Do not warm using a heat source (eg, microwave or hot water). Do not shake. Administer in preferably abdomen (avoiding 2 inches around the navel), thigh, upper arm, or buttocks avoiding areas of skin that are tender, bruised, red or hard. Deliver entire contents of single-use  prefilled pen or syringe.  -----    If needed could add topiramate briefly back to help with pressure and we discussed even though you already have paresthesias/tingling of the fingertips, this could temporarily make it worse it could help with the headaches and then we would wean you off.    *Typically these types of medications take time untill you see the benefit, although some may see improvement in days, often it may take weeks, especially if the medication is being titrated up to a beneficial level. Please contact us if there are any concerns or questions regarding the medication.       Lifestyle Recommendations:  [x] SLEEP - Maintain a regular sleep schedule: Adults need at least 7-8 hours of uninterrupted a night. Maintain good sleep hygiene:  Going to bed and waking up at consistent times, avoiding excessive daytime naps, avoiding caffeinated beverages in the evening, avoid excessive stimulation in the evening and generally using bed primarily for sleeping.  One hour before bedtime would recommend turning lights down lower, decreasing your activity (may read quietly, listen to music at a low volume). When you get into bed, should eliminate all technology (no texting, emailing, playing with your phone, iPad or tablet in bed).  [x] HYDRATION - Maintain good hydration.  Drink  2L of fluid a day (4 typical small water bottles)  [x] DIET - Maintain good nutrition. In particular don't skip meals and try and eat healthy balanced meals regularly.  [x] TRIGGERS - Look for other triggers and avoid them: Limit caffeine to 1-2 cups a day or less. Avoid dietary triggers that you have noticed bring on your headaches (this could include aged cheese, peanuts, MSG, aspartame and nitrates).  [x] EXERCISE - physical exercise as we all know is good for you in many ways, and not only is good for your heart, but also is beneficial for your mental health, cognitive health and  chronic pain/headaches. I would encourage at the  least 5 days of physical exercise weekly for at least 30 minutes.     Education and Follow-up  [x] Please call with any questions or concerns. Of course if any new concerning symptoms go to the emergency department.  [x] Follow up 6 months, sooner if needed   And if you are doing well you can always push back to a year
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
## 2025-04-10 ENCOUNTER — HOSPITAL ENCOUNTER (OUTPATIENT)
Dept: RADIOLOGY | Facility: MEDICAL CENTER | Age: 50
Discharge: HOME | End: 2025-04-10
Payer: COMMERCIAL

## 2025-04-10 VITALS — BODY MASS INDEX: 24.24 KG/M2 | HEIGHT: 64 IN | WEIGHT: 142 LBS

## 2025-04-10 DIAGNOSIS — Z12.31 BREAST CANCER SCREENING BY MAMMOGRAM: ICD-10-CM

## 2025-04-10 PROCEDURE — 77063 BREAST TOMOSYNTHESIS BI: CPT

## 2025-04-10 PROCEDURE — 77067 SCR MAMMO BI INCL CAD: CPT

## 2025-04-21 ENCOUNTER — RESULTS FOLLOW-UP (OUTPATIENT)
Dept: FAMILY MEDICINE CLINIC | Facility: CLINIC | Age: 50
End: 2025-04-21

## 2025-05-08 ENCOUNTER — OFFICE VISIT (OUTPATIENT)
Dept: FAMILY MEDICINE CLINIC | Facility: CLINIC | Age: 50
End: 2025-05-08
Payer: COMMERCIAL

## 2025-05-08 VITALS
OXYGEN SATURATION: 98 % | HEART RATE: 82 BPM | TEMPERATURE: 97.8 F | WEIGHT: 146 LBS | SYSTOLIC BLOOD PRESSURE: 112 MMHG | BODY MASS INDEX: 24.92 KG/M2 | DIASTOLIC BLOOD PRESSURE: 72 MMHG | HEIGHT: 64 IN

## 2025-05-08 DIAGNOSIS — Z23 ENCOUNTER FOR IMMUNIZATION: ICD-10-CM

## 2025-05-08 DIAGNOSIS — N32.81 OVERACTIVE BLADDER: ICD-10-CM

## 2025-05-08 DIAGNOSIS — Z00.00 ANNUAL PHYSICAL EXAM: Primary | ICD-10-CM

## 2025-05-08 DIAGNOSIS — L65.9 HAIR LOSS: ICD-10-CM

## 2025-05-08 DIAGNOSIS — F17.210 CIGARETTE NICOTINE DEPENDENCE WITHOUT COMPLICATION: ICD-10-CM

## 2025-05-08 PROCEDURE — 99396 PREV VISIT EST AGE 40-64: CPT | Performed by: FAMILY MEDICINE

## 2025-05-08 PROCEDURE — 90471 IMMUNIZATION ADMIN: CPT

## 2025-05-08 PROCEDURE — 90677 PCV20 VACCINE IM: CPT

## 2025-05-08 RX ORDER — POLYETHYLENE GLYCOL 3350 17 G
POWDER IN PACKET (EA) ORAL
Qty: 168 LOZENGE | Refills: 0 | Status: SHIPPED | OUTPATIENT
Start: 2025-05-08

## 2025-05-08 RX ORDER — VARENICLINE TARTRATE 0.5 (11)-1
KIT ORAL
Qty: 53 EACH | Refills: 0 | Status: SHIPPED | OUTPATIENT
Start: 2025-05-08

## 2025-05-08 NOTE — PATIENT INSTRUCTIONS
"Patient Education     Quitting smoking   The Basics   Written by the doctors and editors at [facility]   What are the benefits of quitting smoking? -- Quitting smoking can dramatically improve your health and help you live longer. It lowers your risk of heart disease, lung disease, kidney failure, cancer, infection, stomach problems, and diabetes.  Quitting smoking can also lower your chances of getting osteoporosis, a condition that makes your bones weak.  Quitting is not easy for most people, and it might take several tries to completely quit. But help and support are available. Quitting smoking will improve your health no matter how old you are, even if you have smoked for a long time.  What should I do if I want to quit smoking? -- It's a good idea to start by talking with your doctor or nurse. It is possible to quit on your own, without help. But getting help greatly increases your chances of quitting successfully.  When you are ready to quit, you will make a plan to:   Set a quit date.   Tell your family and friends that you plan to quit.   Plan ahead for the challenges you will face, such as cigarette cravings.   Remove cigarettes from your home, car, and work.  How can my doctor or nurse help? -- Your doctor or nurse can give you advice on the best way to quit. They can also give you medicines to:   Reduce your craving for cigarettes   Reduce your \"withdrawal\" symptoms (symptoms that happen when you stop smoking)  Your doctor or nurse can also help you find a counselor to talk to. For most people who are trying to quit smoking, it works best to use both medicines and counseling.  You can also get help from a free phone line (9-897-QUITNOW, or 1-661.924.6904) or go online to www.smokefree.gov.  What are the symptoms of withdrawal? -- When you stop smoking, you might have symptoms such as:   Trouble sleeping   Feeling irritable, anxious, or restless   Getting frustrated or angry   Having trouble thinking " clearly  These symptoms can be hard to deal with, which is why it can be so hard to quit. But medicines can help.  Some people who stop smoking become temporarily depressed. Some people need treatment for depression, such as counseling or medicines or both. People with depression might:   No longer enjoy or care about doing the things they used to like to do   Feel sad, down, hopeless, nervous, or cranky most of the day, almost every day   Lose or gain weight   Sleep too much or too little   Feel tired or like they have no energy   Feel guilty or like they are worth nothing   Forget things or feel confused   Move and speak more slowly than usual   Act restless or have trouble staying still   Think about death or suicide  If you think you might be depressed, tell your doctor or nurse right away. They can talk to you about your symptoms and recommend treatment if needed.  Get help right away if you are thinking of hurting or killing yourself! -- Sometimes, people with depression think of hurting or killing themselves. If you ever feel like you might hurt yourself, help is available:   In the , contact the [employer] [facility]icide [facility] Crisis Lifeline:   To speak to someone, call or text [employer].   To talk to someone online, go to [email or web address]/chat.   Call your doctor or nurse, and tell them that it is an emergency.   Call for an ambulance (in the [place] and [place], call 9-1-1).   Go to the emergency department at your local hospital.  If you think your partner might have depression, or if you are worried that they might hurt themselves, get them help right away.  How does counseling work? -- A counselor can help you figure out:   What triggers you to want to smoke, and how to handle these situations   How to resist cravings   What you can do differently if you have tried to quit before  You can meet with a counselor in 1-on-1 sessions or as part of a group. There are other ways to get counseling, too, such as over the phone, through  "text messaging, or online.  How do medicines help you stop smoking? -- Different medicines work in different ways:   Nicotine replacement therapy - Nicotine is the main drug in cigarettes, and the reason they are addictive. These medicines reduce your body's craving for nicotine. They also help with withdrawal symptoms.  There are different forms of nicotine replacement, including skin patches, lozenges, gum, nasal sprays, and inhalers. Most can be bought without a prescription. Also, health insurance might cover some or all of the cost.  It often helps to use 2 forms of nicotine replacement. For example, you might wear a patch all the time, plus use gum or lozenges when you get a craving to smoke.   Varenicline - Varenicline (brand names: Chantix, Champix) is a prescription medicine that reduces withdrawal symptoms and cigarette cravings. Varenicline can increase the effects of alcohol in some people. It's a good idea to limit drinking while you're taking it, at least until you know how it affects you.  Even if you are not yet ready to commit to a quit date, varenicline can help reduce cravings. This can make it easier to quit when you are ready.   Bupropion - Bupropion (sample brand names: Wellbutrin, Zyban) is a prescription medicine that reduces your desire to smoke. It is also available in a generic version, which is cheaper than the brand name medicines. Doctors do not usually prescribe bupropion for people with seizures or who have had seizures in the past.  It might also be helpful to combine nicotine replacement with bupropion or varenicline. In some cases, a person might even take both bupropion and varenicline. Your doctor or nurse can help you figure out the best combination for you.  Can vaping help me quit? -- Sometimes, people wonder if vaping can help them quit smoking. Vaping is using electronic cigarettes, or \"e-cigarettes.\"  Doctors recommend using medicines and counseling to quit smoking. These " methods have been studied the most. But for people who have tried these and not been able to quit, switching to vaping might be an option. There are some things to remember:   Vaping might be less harmful than smoking regular cigarettes. But e-cigarettes still contain nicotine as well as other substances that might be harmful.   If you decide to try vaping to help you quit, it's important to switch completely from regular cigarettes to e-cigarettes. Using both will probably not be helpful, and might increase the risks of harm.   It's not clear how vaping can affect a person's health in the long term.  For these reasons, doctors recommend that if you do try vaping to help you quit smoking, you still make a plan to quit vaping eventually.  If you are interested in trying vaping to help you quit smoking, it's a good idea to talk to your doctor or nurse first.  What if I am pregnant and I smoke? -- If you are pregnant, it's really important for the health of your baby that you quit. Ask your doctor what options you have, and what is safest for your baby.  What if I have already smoked for a long time? -- The longer you have smoked, the higher your chances are of having health problems. But it is never too late to quit smoking. It helps your health even if you are older or have smoked for many years. The best thing you can do to lower your chance of having a health problem caused by smoking is to quit.  Will I gain weight if I quit? -- You might gain a few pounds. This can be frustrating for some people. But it's important to remember that you are improving your health by quitting smoking. You can help prevent gaining a lot of weight by staying active and eating a healthy diet.  What if I am not able to quit? -- If you don't quit on your first try, or if you quit but then start smoking again, don't give up hope. Lots of people have to try more than once before they are able to completely quit.  It might help to try to  understand why quitting did not work. There might be something you can do differently when you try again. It can help to figure out which situations make you want to smoke, so you can avoid them.  What else can I do to improve my chances of quitting? -- You can:   Get regular exercise. Any type of physical activity, even gentle forms of movement, is good for your health. Physical activity can also help reduce stress.   Stay away from people who smoke and places that make you want to smoke. If people close to you smoke, ask them to quit with you or avoid smoking around you.   Carry gum, hard candy, or something to put in your mouth. If you get a craving for a cigarette, try 1 of these instead.   Don't give up, even if you start smoking again. It takes most people a few tries before they succeed.  All topics are updated as new evidence becomes available and our peer review process is complete.  This topic retrieved from [employer] on: Mar 14, 2024.  Topic 35798 Version 33.0  Release: 32.2.4 - C32.72  © 2024 UpToDate, Inc. and/or its affiliates. All rights reserved.  Consumer Information Use and Disclaimer   Disclaimer: This generalized information is a limited summary of diagnosis, treatment, and/or medication information. It is not meant to be comprehensive and should be used as a tool to help the user understand and/or assess potential diagnostic and treatment options. It does NOT include all information about conditions, treatments, medications, side effects, or risks that may apply to a specific patient. It is not intended to be medical advice or a substitute for the medical advice, diagnosis, or treatment of a health care provider based on the health care provider's examination and assessment of a patient's specific and unique circumstances. Patients must speak with a health care provider for complete information about their health, medical questions, and treatment options, including any risks or benefits regarding  use of medications. This information does not endorse any treatments or medications as safe, effective, or approved for treating a specific patient. UpToDate, Inc. and its affiliates disclaim any warranty or liability relating to this information or the use thereof.The use of this information is governed by the Terms of Use, available at https://www.[employer].com/en/know/clinical-effectiveness-terms. 2024© UpToDate, Inc. and its affiliates and/or licensors. All rights reserved.  Copyright   © 2024 UpToDate, Inc. and/or its affiliates. All rights reserved.  Patient Education     Varenicline (Systemic) (anjelica EN i kleen)   Brand Names: [place] APO-Varenicline; Chantix Continuing Month Roly [DSC]; Chantix Starting Month Roly [DSC]; Chantix [DSC]   Brand Names: [place] APO-Varenicline; Champix Starter Pack [DSC]; Champix [DSC]; TEVA-Varenicline   What is this drug used for?   It is used to help you stop smoking.  What do I need to tell my doctor BEFORE I take this drug?   If you are allergic to this drug; any part of this drug; or any other drugs, foods, or substances. Tell your doctor about the allergy and what signs you had.  If the patient is a child. Do not give this drug to a child.  This drug may interact with other drugs or health problems.  Tell your doctor and pharmacist about all of your drugs (prescription or OTC, natural products, vitamins) and health problems. You must check to make sure that it is safe for you to take this drug with all of your drugs and health problems. Do not start, stop, or change the dose of any drug without checking with your doctor.  What are some things I need to know or do while I take this drug?   Tell all of your health care providers that you take this drug. This includes your doctors, nurses, pharmacists, and dentists.  Avoid driving and doing other tasks or actions that call for you to be alert until you see how this drug affects you.  Accidents like car accidents have happened in  people taking this drug. Talk with your doctor.  Do not take this drug for longer than you were told by your doctor.  Talk with your doctor before you drink alcohol.  When you stop smoking, other drugs may be affected. Talk with your doctor.  Talk with your doctor if you have seizures or have ever had seizures.  New or worse mental, mood, or behavior problems have happened with this drug. These problems include thoughts of suicide or murder, depression, forceful actions, fury, anxiety, and anger. These problems have happened in people with and without a history of mental or mood problems. Watch people who take this drug closely. If you think you have any of these problems, call your doctor right away. Call your doctor right away if you have any other signs like nervousness, restlessness, grouchiness, panic attacks, or any new or worse changes in mood or actions.  You may have signs of nicotine withdrawal when you try to quit smoking even when using drugs like this one to help you quit smoking. There are many signs of nicotine withdrawal. Rarely depression and suicidal thoughts have happened in people trying to quit smoking. Talk with your doctor.  A very bad reaction called angioedema has happened with this drug. Sometimes, this may be life-threatening. Signs may include swelling of the hands, face, lips, eyes, tongue, or throat; trouble breathing; trouble swallowing; or unusual hoarseness. Get medical help right away if you have any of these signs.  Tell your doctor if you are pregnant, plan on getting pregnant, or are breast-feeding. You will need to talk about the benefits and risks to you and the baby.  What are some side effects that I need to call my doctor about right away?   WARNING/CAUTION: Even though it may be rare, some people may have very bad and sometimes deadly side effects when taking a drug. Tell your doctor or get medical help right away if you have any of the following signs or symptoms that may  be related to a very bad side effect:  Signs of an allergic reaction, like rash; hives; itching; red, swollen, blistered, or peeling skin with or without fever; wheezing; tightness in the chest or throat; trouble breathing, swallowing, or talking; unusual hoarseness; or swelling of the mouth, face, lips, tongue, or throat.  Shortness of breath.  Feeling confused.  Hallucinations (seeing or hearing things that are not there).  Seizures.  A severe skin reaction (Lira-Ghassan syndrome/toxic epidermal necrolysis) may happen. It can cause severe health problems that may not go away, and sometimes death. Get medical help right away if you have signs like red, swollen, blistered, or peeling skin (with or without fever); red or irritated eyes; or sores in your mouth, throat, nose, or eyes.  New or worse heart and blood vessel problems have happened with this drug. This includes heart attack and stroke. Most of the time, this happened in people who already had heart or blood vessel problems. Call your doctor right away if you have signs of heart attack like chest pain that may spread to the arms, neck, jaw, back, or stomach; sweating that is not normal; or feeling sick or throwing up. Call your doctor right away if you have signs of stroke like weakness on 1 side of the body; eyesight, speech, or balance problems; feeling confused; drooping on 1 side of the face; or very bad headache.  Sleepwalking has happened with this drug. Sometimes, this can lead to harm to you or others. Call your doctor right away if you start to sleepwalk.  What are some other side effects of this drug?   All drugs may cause side effects. However, many people have no side effects or only have minor side effects. Call your doctor or get medical help if any of these side effects or any other side effects bother you or do not go away:  Upset stomach or throwing up.  Strange or odd dreams.  Gas.  Trouble sleeping.  Headache.  Constipation.  Dry  mouth.  Stomach pain.  Change in taste.  Feeling tired or weak.  Signs of a common cold.  These are not all of the side effects that may occur. If you have questions about side effects, call your doctor. Call your doctor for medical advice about side effects.  You may report side effects to your national health agency.  You may report side effects to the FDA at 1-547.815.7450. You may also report side effects at https://www.fda.gov/medwatch.  How is this drug best taken?   Use this drug as ordered by your doctor. Read all information given to you. Follow all instructions closely.  Get counseling to help you quit smoking.  Take after eating with a full glass of water.  There are different ways that you can use this drug to help you quit smoking. Talk with your doctor about when to start treatment with this drug and when to stop smoking.  What do I do if I miss a dose?   Take a missed dose as soon as you think about it.  If it is close to the time for your next dose, skip the missed dose and go back to your normal time.  Do not take 2 doses at the same time or extra doses.  How do I store and/or throw out this drug?   Store at room temperature in a dry place. Do not store in a bathroom.  Keep all drugs in a safe place. Keep all drugs out of the reach of children and pets.  Throw away unused or  drugs. Do not flush down a toilet or pour down a drain unless you are told to do so. Check with your pharmacist if you have questions about the best way to throw out drugs. There may be drug take-back programs in your area.  General drug facts   If your symptoms or health problems do not get better or if they become worse, call your doctor.  Do not share your drugs with others and do not take anyone else's drugs.  Some drugs may have another patient information leaflet. If you have any questions about this drug, please talk with your doctor, nurse, pharmacist, or other health care provider.  This drug comes with an extra  patient fact sheet called a Medication Guide. Read it with care. Read it again each time this drug is refilled. If you have any questions about this drug, please talk with the doctor, pharmacist, or other health care provider.  If you think there has been an overdose, call your poison control center or get medical care right away. Be ready to tell or show what was taken, how much, and when it happened.  Consumer Information Use and Disclaimer   This generalized information is a limited summary of diagnosis, treatment, and/or medication information. It is not meant to be comprehensive and should be used as a tool to help the user understand and/or assess potential diagnostic and treatment options. It does NOT include all information about conditions, treatments, medications, side effects, or risks that may apply to a specific patient. It is not intended to be medical advice or a substitute for the medical advice, diagnosis, or treatment of a health care provider based on the health care provider's examination and assessment of a patient's specific and unique circumstances. Patients must speak with a health care provider for complete information about their health, medical questions, and treatment options, including any risks or benefits regarding use of medications. This information does not endorse any treatments or medications as safe, effective, or approved for treating a specific patient. UpToDate, Inc. and its affiliates disclaim any warranty or liability relating to this information or the use thereof. The use of this information is governed by the Terms of Use, available at https://www.[employer].com/en/know/clinical-effectiveness-terms.  Last Reviewed Date   2022-02-04  Copyright   © 2024 UpToDate, Inc. and its affiliates and/or licensors. All rights reserved.  Patient Education     Routine physical for adults   The Basics   Written by the doctors and editors at [facility]   What is a physical? -- A physical  "is a routine visit, or \"check-up,\" with your doctor. You might also hear it called a \"wellness visit\" or \"preventive visit.\"  During each visit, the doctor will:   Ask about your physical and mental health   Ask about your habits, behaviors, and lifestyle   Do an exam   Give you vaccines if needed   Talk to you about any medicines you take   Give advice about your health   Answer your questions  Getting regular check-ups is an important part of taking care of your health. It can help your doctor find and treat any problems you have. But it's also important for preventing health problems.  A routine physical is different from a \"sick visit.\" A sick visit is when you see a doctor because of a health concern or problem. Since physicals are scheduled ahead of time, you can think about what you want to ask the doctor.  How often should I get a physical? -- It depends on your age and health. In general, for people age 21 years and older:   If you are younger than 50 years, you might be able to get a physical every 3 years.   If you are 50 years or older, your doctor might recommend a physical every year.  If you have an ongoing health condition, like diabetes or high blood pressure, your doctor will probably want to see you more often.  What happens during a physical? -- In general, each visit will include:   Physical exam - The doctor or nurse will check your height, weight, heart rate, and blood pressure. They will also look at your eyes and ears. They will ask about how you are feeling and whether you have any symptoms that bother you.   Medicines - It's a good idea to bring a list of all the medicines you take to each doctor visit. Your doctor will talk to you about your medicines and answer any questions. Tell them if you are having any side effects that bother you. You should also tell them if you are having trouble paying for any of your medicines.   Habits and behaviors - This includes:   Your diet   Your exercise " "habits   Whether you smoke, drink alcohol, or use drugs   Whether you are sexually active   Whether you feel safe at home  Your doctor will talk to you about things you can do to improve your health and lower your risk of health problems. They will also offer help and support. For example, if you want to quit smoking, they can give you advice and might prescribe medicines. If you want to improve your diet or get more physical activity, they can help you with this, too.   Lab tests, if needed - The tests you get will depend on your age and situation. For example, your doctor might want to check your:   Cholesterol   Blood sugar   Iron level   Vaccines - The recommended vaccines will depend on your age, health, and what vaccines you already had. Vaccines are very important because they can prevent certain serious or deadly infections.   Discussion of screening - \"Screening\" means checking for diseases or other health problems before they cause symptoms. Your doctor can recommend screening based on your age, risk, and preferences. This might include tests to check for:   Cancer, such as breast, prostate, cervical, ovarian, colorectal, prostate, lung, or skin cancer   Sexually transmitted infections, such as chlamydia and gonorrhea   Mental health conditions like depression and anxiety  Your doctor will talk to you about the different types of screening tests. They can help you decide which screenings to have. They can also explain what the results might mean.   Answering questions - The physical is a good time to ask the doctor or nurse questions about your health. If needed, they can refer you to other doctors or specialists, too.  Adults older than 65 years often need other care, too. As you get older, your doctor will talk to you about:   How to prevent falling at home   Hearing or vision tests   Memory testing   How to take your medicines safely   Making sure that you have the help and support you need at home  All " topics are updated as new evidence becomes available and our peer review process is complete.  This topic retrieved from [employer] on: May 02, 2024.  Topic 009205 Version 1.0  Release: 32.4.3 - C32.122  © 2024 UpToDate, Inc. and/or its affiliates. All rights reserved.  Consumer Information Use and Disclaimer   Disclaimer: This generalized information is a limited summary of diagnosis, treatment, and/or medication information. It is not meant to be comprehensive and should be used as a tool to help the user understand and/or assess potential diagnostic and treatment options. It does NOT include all information about conditions, treatments, medications, side effects, or risks that may apply to a specific patient. It is not intended to be medical advice or a substitute for the medical advice, diagnosis, or treatment of a health care provider based on the health care provider's examination and assessment of a patient's specific and unique circumstances. Patients must speak with a health care provider for complete information about their health, medical questions, and treatment options, including any risks or benefits regarding use of medications. This information does not endorse any treatments or medications as safe, effective, or approved for treating a specific patient. [employer] and its affiliates disclaim any warranty or liability relating to this information or the use thereof.The use of this information is governed by the Terms of Use, available at https://www.[employer].com/en/know/clinical-effectiveness-terms. 2024© [employer] and its affiliates and/or licensors. All rights reserved.  Copyright   © 2024 [employer] and/or its affiliates. All rights reserved.

## 2025-05-08 NOTE — PROGRESS NOTES
Adult Annual Physical  Name: Jana Parks      : 1975      MRN: [id number]  Encounter Provider: Isabel Hart MD  Encounter Date: 2025   Encounter department: [facility]    Assessment & Plan  Annual physical exam    Orders:  •  Lipid Panel with Direct LDL reflex; Future  •  CBC; Future  •  Comprehensive metabolic panel; Future    Overactive bladder         Hair loss    Orders:  •  TSH, 3rd generation with Free T4 reflex; Future  •  Ferritin; Future    Cigarette nicotine dependence without complication  Encourage smoking cessation   Orders:  •  nicotine polacrilex (COMMIT) 2 MG lozenge; 2mg every 1-2h for 6 weeks then every 2-4h for 3 weeks then every 4-8h for 3 weeks  •  Ambulatory referral to Smoking Cessation Program; Future  •  Varenicline Tartrate, Starter, (Chantix Starting Month ) 0.5 MG X 11 & 1 MG X 42 TBPK; 0.5 mg once daily for 3 days then 0.5mg twice daily for days 4-7 then 1 mg twice daily    Encounter for immunization    Orders:  •  Pneumococcal Conjugate Vaccine 20-valent (Pcv20)        Preventive Screenings:  - Diabetes Screening: orders placed  - Cholesterol Screening: orders placed   - Hepatitis C screening: screening up-to-date   - Breast cancer screening: screening up-to-date   - Colon cancer screening: screening up-to-date   - Lung cancer screening: screening not indicated     Immunizations:  - Immunizations due: Prevnar 20  - Risks/benefits immunizations discussed      Counseling/Anticipatory Guidance:    - Dental health: discussed importance of regular tooth brushing, flossing, and dental visits.   - Diet: discussed recommendations for a healthy/well-balanced diet.   - Exercise: the importance of regular exercise/physical activity was discussed. Recommend exercise 3-5 times per week for at least 30 minutes.       Depression Screening and Follow-up Plan: Patient was screened for depression during today's encounter. They screened negative with  a PHQ-2 score of 0.      Tobacco Cessation Counseling: Tobacco cessation counseling was provided. The patient is sincerely urged to quit consumption of tobacco. She is ready to quit tobacco. Medication options and side effects of medication discussed. Varenicline (chantix) was prescribed.         History of Present Illness     Adult Annual Physical:  Patient presents for annual physical. Hair loss for at least 6 months. Thinning in general, especially on top. She does highlight hair. She tried herbal shampoos which has helped a bit.  She is still menstruating but it is irregular now. She has noted hot flashes.    Smoking - started since 17yr. Has tried to quit previously. She's tried cold turkey, weaning, hypnosis, gum. She did have success with hypnosis for a short period. .     Diet and Physical Activity:  - Diet/Nutrition: well balanced diet and consuming 3-5 servings of fruits/vegetables daily.  - Exercise: walking and 30-60 minutes on average.    Depression Screening:  - PHQ-2 Score: 0    General Health:  - Sleep: sleeps well and 7-8 hours of sleep on average.  - Hearing: tinnitus.  - Vision: wears glasses and contacts.  - Dental: regular dental visits.    /GYN Health:  - Follows with GYN: no.   - Last menstrual cycle: 4/17/2025.   - History of STDs: no  - Contraception: male partner had vasectomy.      Advanced Care Planning:  - Has an advanced directive?: no    - Has a durable medical POA?: no      Review of Systems   Constitutional:  Negative for chills and fever.   HENT:  Negative for congestion and sore throat.    Eyes:  Negative for pain and visual disturbance.   Respiratory:  Negative for cough and shortness of breath.    Cardiovascular:  Negative for chest pain and palpitations.   Gastrointestinal:  Negative for abdominal pain and nausea.   Genitourinary:  Negative for dysuria.   Musculoskeletal:  Negative for arthralgias and myalgias.   Skin:  Negative for rash and wound.   Neurological:  Negative  "for dizziness and headaches.   All other systems reviewed and are negative.    Medical History Reviewed by provider this encounter:  Tobacco  Allergies  Meds  Problems  Med Hx  Surg Hx  Fam Hx     .    Objective   /72 (BP Location: Left arm, Patient Position: Sitting, Cuff Size: Standard)   Pulse 82   Temp 97.8 °F (36.6 °C) (Temporal)   Ht 5' 4\" (1.626 m)   Wt 66.2 kg (146 lb)   LMP 04/17/2025   SpO2 98%   BMI 25.06 kg/m²     Physical Exam  Vitals and nursing note reviewed.   Constitutional:       General: She is not in acute distress.     Appearance: She is well-developed. She is not ill-appearing.   HENT:      Head: Normocephalic and atraumatic.      Right Ear: Tympanic membrane, ear canal and external ear normal. No middle ear effusion.      Left Ear: Tympanic membrane, ear canal and external ear normal.  No middle ear effusion.      Nose: Nose normal. No congestion or rhinorrhea.      Mouth/Throat:      Lips: Pink.      Mouth: Mucous membranes are moist.      Pharynx: Oropharynx is clear. Uvula midline. No oropharyngeal exudate.      Tonsils: No tonsillar exudate.   Eyes:      General: Lids are normal.      Extraocular Movements: Extraocular movements intact.      Conjunctiva/sclera: Conjunctivae normal.      Pupils: Pupils are equal, round, and reactive to light.   Neck:      Thyroid: No thyromegaly.      Trachea: No tracheal deviation.   Cardiovascular:      Rate and Rhythm: Normal rate and regular rhythm.      Pulses: Normal pulses.      Heart sounds: Normal heart sounds, S1 normal and S2 normal. No murmur heard.  Pulmonary:      Effort: Pulmonary effort is normal. No respiratory distress.      Breath sounds: Normal breath sounds. No decreased breath sounds, wheezing, rhonchi or rales.   Abdominal:      General: Bowel sounds are normal. There is no distension.      Palpations: Abdomen is soft.      Tenderness: There is no abdominal tenderness.   Musculoskeletal:      Right lower leg: No " edema.      Left lower leg: No edema.   Lymphadenopathy:      Cervical: No cervical adenopathy.   Skin:     General: Skin is warm and dry.      Capillary Refill: Capillary refill takes less than 2 seconds.   Neurological:      Mental Status: She is alert and oriented to person, place, and time.      Deep Tendon Reflexes: Reflexes normal.      Reflex Scores:       Patellar reflexes are 2+ on the right side and 2+ on the left side.  Psychiatric:         Attention and Perception: Attention normal.         Mood and Affect: Mood normal.         Thought Content: Thought content does not include suicidal ideation.

## 2025-05-13 ENCOUNTER — CLINICAL SUPPORT (OUTPATIENT)
Dept: FAMILY MEDICINE CLINIC | Facility: CLINIC | Age: 50
End: 2025-05-13
Payer: COMMERCIAL

## 2025-05-13 DIAGNOSIS — Z23 ENCOUNTER FOR IMMUNIZATION: Primary | ICD-10-CM

## 2025-05-13 PROCEDURE — 90471 IMMUNIZATION ADMIN: CPT

## 2025-05-13 PROCEDURE — 90750 HZV VACC RECOMBINANT IM: CPT

## 2025-05-20 ENCOUNTER — TELEPHONE (OUTPATIENT)
Dept: OTHER | Facility: OTHER | Age: 50
End: 2025-05-20

## 2025-05-20 NOTE — TELEPHONE ENCOUNTER
Patient is calling regarding cancelling an appointment.     Date/Time: 5/21/25 130 pm     Patient was rescheduled: YES [ ] NO [ x]     Patient requesting call back to reschedule: YES [ x] NO [ ]

## 2025-05-21 ENCOUNTER — PATIENT OUTREACH (OUTPATIENT)
Dept: PULMONOLOGY | Facility: CLINIC | Age: 50
End: 2025-05-21

## 2025-07-08 ENCOUNTER — HOSPITAL ENCOUNTER (OUTPATIENT)
Dept: HOSPITAL 99 - HWEVLT | Age: 50
End: 2025-07-08
Payer: COMMERCIAL

## 2025-07-08 DIAGNOSIS — I83.893: Primary | ICD-10-CM

## 2025-07-15 ENCOUNTER — CLINICAL SUPPORT (OUTPATIENT)
Dept: FAMILY MEDICINE CLINIC | Facility: CLINIC | Age: 50
End: 2025-07-15
Payer: COMMERCIAL

## 2025-07-15 DIAGNOSIS — Z23 ENCOUNTER FOR IMMUNIZATION: Primary | ICD-10-CM

## 2025-07-15 PROCEDURE — 90750 HZV VACC RECOMBINANT IM: CPT

## 2025-07-15 PROCEDURE — 90471 IMMUNIZATION ADMIN: CPT
